# Patient Record
Sex: FEMALE | Race: WHITE | NOT HISPANIC OR LATINO | Employment: FULL TIME | URBAN - METROPOLITAN AREA
[De-identification: names, ages, dates, MRNs, and addresses within clinical notes are randomized per-mention and may not be internally consistent; named-entity substitution may affect disease eponyms.]

---

## 2021-10-27 ENCOUNTER — OFFICE VISIT (OUTPATIENT)
Dept: INTERNAL MEDICINE | Facility: CLINIC | Age: 56
End: 2021-10-27
Payer: COMMERCIAL

## 2021-10-27 VITALS
HEART RATE: 66 BPM | DIASTOLIC BLOOD PRESSURE: 68 MMHG | TEMPERATURE: 99 F | WEIGHT: 130.06 LBS | SYSTOLIC BLOOD PRESSURE: 120 MMHG

## 2021-10-27 DIAGNOSIS — Z91.010 PEANUT ALLERGY: ICD-10-CM

## 2021-10-27 DIAGNOSIS — Z12.31 ENCOUNTER FOR SCREENING MAMMOGRAM FOR HIGH-RISK PATIENT: ICD-10-CM

## 2021-10-27 DIAGNOSIS — Z12.11 COLON CANCER SCREENING: ICD-10-CM

## 2021-10-27 DIAGNOSIS — Z00.00 ROUTINE GENERAL MEDICAL EXAMINATION AT HEALTH CARE FACILITY: Primary | ICD-10-CM

## 2021-10-27 DIAGNOSIS — R13.19 OTHER DYSPHAGIA: ICD-10-CM

## 2021-10-27 PROCEDURE — 99999 PR PBB SHADOW E&M-NEW PATIENT-LVL IV: CPT | Mod: PBBFAC,,, | Performed by: INTERNAL MEDICINE

## 2021-10-27 PROCEDURE — 1159F PR MEDICATION LIST DOCUMENTED IN MEDICAL RECORD: ICD-10-PCS | Mod: CPTII,S$GLB,, | Performed by: INTERNAL MEDICINE

## 2021-10-27 PROCEDURE — 3074F SYST BP LT 130 MM HG: CPT | Mod: CPTII,S$GLB,, | Performed by: INTERNAL MEDICINE

## 2021-10-27 PROCEDURE — 1160F RVW MEDS BY RX/DR IN RCRD: CPT | Mod: CPTII,S$GLB,, | Performed by: INTERNAL MEDICINE

## 2021-10-27 PROCEDURE — 3078F DIAST BP <80 MM HG: CPT | Mod: CPTII,S$GLB,, | Performed by: INTERNAL MEDICINE

## 2021-10-27 PROCEDURE — 90471 FLU VACCINE (QUAD) GREATER THAN OR EQUAL TO 3YO PRESERVATIVE FREE IM: ICD-10-PCS | Mod: S$GLB,,, | Performed by: INTERNAL MEDICINE

## 2021-10-27 PROCEDURE — 3078F PR MOST RECENT DIASTOLIC BLOOD PRESSURE < 80 MM HG: ICD-10-PCS | Mod: CPTII,S$GLB,, | Performed by: INTERNAL MEDICINE

## 2021-10-27 PROCEDURE — 1160F PR REVIEW ALL MEDS BY PRESCRIBER/CLIN PHARMACIST DOCUMENTED: ICD-10-PCS | Mod: CPTII,S$GLB,, | Performed by: INTERNAL MEDICINE

## 2021-10-27 PROCEDURE — 90686 FLU VACCINE (QUAD) GREATER THAN OR EQUAL TO 3YO PRESERVATIVE FREE IM: ICD-10-PCS | Mod: S$GLB,,, | Performed by: INTERNAL MEDICINE

## 2021-10-27 PROCEDURE — 90471 IMMUNIZATION ADMIN: CPT | Mod: S$GLB,,, | Performed by: INTERNAL MEDICINE

## 2021-10-27 PROCEDURE — 3074F PR MOST RECENT SYSTOLIC BLOOD PRESSURE < 130 MM HG: ICD-10-PCS | Mod: CPTII,S$GLB,, | Performed by: INTERNAL MEDICINE

## 2021-10-27 PROCEDURE — 1159F MED LIST DOCD IN RCRD: CPT | Mod: CPTII,S$GLB,, | Performed by: INTERNAL MEDICINE

## 2021-10-27 PROCEDURE — 99999 PR PBB SHADOW E&M-NEW PATIENT-LVL IV: ICD-10-PCS | Mod: PBBFAC,,, | Performed by: INTERNAL MEDICINE

## 2021-10-27 PROCEDURE — 90686 IIV4 VACC NO PRSV 0.5 ML IM: CPT | Mod: S$GLB,,, | Performed by: INTERNAL MEDICINE

## 2021-10-27 PROCEDURE — 99396 PR PREVENTIVE VISIT,EST,40-64: ICD-10-PCS | Mod: 25,S$GLB,, | Performed by: INTERNAL MEDICINE

## 2021-10-27 PROCEDURE — 99396 PREV VISIT EST AGE 40-64: CPT | Mod: 25,S$GLB,, | Performed by: INTERNAL MEDICINE

## 2021-10-27 RX ORDER — EPINEPHRINE 0.3 MG/.3ML
1 INJECTION SUBCUTANEOUS ONCE
Qty: 2 EACH | Refills: 5 | Status: SHIPPED | OUTPATIENT
Start: 2021-10-27 | End: 2023-10-30

## 2021-10-28 ENCOUNTER — LAB VISIT (OUTPATIENT)
Dept: LAB | Facility: HOSPITAL | Age: 56
End: 2021-10-28
Attending: INTERNAL MEDICINE
Payer: COMMERCIAL

## 2021-10-28 DIAGNOSIS — Z00.00 ROUTINE GENERAL MEDICAL EXAMINATION AT HEALTH CARE FACILITY: ICD-10-CM

## 2021-10-28 LAB
ALBUMIN SERPL BCP-MCNC: 3.9 G/DL (ref 3.5–5.2)
ALP SERPL-CCNC: 71 U/L (ref 55–135)
ALT SERPL W/O P-5'-P-CCNC: 24 U/L (ref 10–44)
ANION GAP SERPL CALC-SCNC: 9 MMOL/L (ref 8–16)
AST SERPL-CCNC: 24 U/L (ref 10–40)
BASOPHILS # BLD AUTO: 0.05 K/UL (ref 0–0.2)
BASOPHILS NFR BLD: 1.1 % (ref 0–1.9)
BILIRUB SERPL-MCNC: 0.4 MG/DL (ref 0.1–1)
BUN SERPL-MCNC: 19 MG/DL (ref 6–20)
CALCIUM SERPL-MCNC: 9.3 MG/DL (ref 8.7–10.5)
CHLORIDE SERPL-SCNC: 108 MMOL/L (ref 95–110)
CHOLEST SERPL-MCNC: 160 MG/DL (ref 120–199)
CHOLEST/HDLC SERPL: 2.4 {RATIO} (ref 2–5)
CO2 SERPL-SCNC: 26 MMOL/L (ref 23–29)
CREAT SERPL-MCNC: 0.8 MG/DL (ref 0.5–1.4)
DIFFERENTIAL METHOD: ABNORMAL
EOSINOPHIL # BLD AUTO: 0.2 K/UL (ref 0–0.5)
EOSINOPHIL NFR BLD: 3.8 % (ref 0–8)
ERYTHROCYTE [DISTWIDTH] IN BLOOD BY AUTOMATED COUNT: 14.1 % (ref 11.5–14.5)
EST. GFR  (AFRICAN AMERICAN): >60 ML/MIN/1.73 M^2
EST. GFR  (NON AFRICAN AMERICAN): >60 ML/MIN/1.73 M^2
GLUCOSE SERPL-MCNC: 88 MG/DL (ref 70–110)
HCT VFR BLD AUTO: 40.5 % (ref 37–48.5)
HDLC SERPL-MCNC: 67 MG/DL (ref 40–75)
HDLC SERPL: 41.9 % (ref 20–50)
HGB BLD-MCNC: 12.9 G/DL (ref 12–16)
IMM GRANULOCYTES # BLD AUTO: 0.01 K/UL (ref 0–0.04)
IMM GRANULOCYTES NFR BLD AUTO: 0.2 % (ref 0–0.5)
LDLC SERPL CALC-MCNC: 81.6 MG/DL (ref 63–159)
LYMPHOCYTES # BLD AUTO: 1 K/UL (ref 1–4.8)
LYMPHOCYTES NFR BLD: 21.8 % (ref 18–48)
MCH RBC QN AUTO: 29.3 PG (ref 27–31)
MCHC RBC AUTO-ENTMCNC: 31.9 G/DL (ref 32–36)
MCV RBC AUTO: 92 FL (ref 82–98)
MONOCYTES # BLD AUTO: 0.5 K/UL (ref 0.3–1)
MONOCYTES NFR BLD: 9.5 % (ref 4–15)
NEUTROPHILS # BLD AUTO: 3 K/UL (ref 1.8–7.7)
NEUTROPHILS NFR BLD: 63.6 % (ref 38–73)
NONHDLC SERPL-MCNC: 93 MG/DL
NRBC BLD-RTO: 0 /100 WBC
PLATELET # BLD AUTO: 238 K/UL (ref 150–450)
PMV BLD AUTO: 10.7 FL (ref 9.2–12.9)
POTASSIUM SERPL-SCNC: 4.1 MMOL/L (ref 3.5–5.1)
PROT SERPL-MCNC: 6.8 G/DL (ref 6–8.4)
RBC # BLD AUTO: 4.41 M/UL (ref 4–5.4)
SODIUM SERPL-SCNC: 143 MMOL/L (ref 136–145)
TRIGL SERPL-MCNC: 57 MG/DL (ref 30–150)
TSH SERPL DL<=0.005 MIU/L-ACNC: 1.27 UIU/ML (ref 0.4–4)
WBC # BLD AUTO: 4.76 K/UL (ref 3.9–12.7)

## 2021-10-28 PROCEDURE — 86803 HEPATITIS C AB TEST: CPT | Performed by: INTERNAL MEDICINE

## 2021-10-28 PROCEDURE — 84443 ASSAY THYROID STIM HORMONE: CPT | Performed by: INTERNAL MEDICINE

## 2021-10-28 PROCEDURE — 80061 LIPID PANEL: CPT | Performed by: INTERNAL MEDICINE

## 2021-10-28 PROCEDURE — 87389 HIV-1 AG W/HIV-1&-2 AB AG IA: CPT | Performed by: INTERNAL MEDICINE

## 2021-10-28 PROCEDURE — 36415 COLL VENOUS BLD VENIPUNCTURE: CPT | Mod: PO | Performed by: INTERNAL MEDICINE

## 2021-10-28 PROCEDURE — 80053 COMPREHEN METABOLIC PANEL: CPT | Performed by: INTERNAL MEDICINE

## 2021-10-28 PROCEDURE — 85025 COMPLETE CBC W/AUTO DIFF WBC: CPT | Performed by: INTERNAL MEDICINE

## 2021-10-29 LAB
HCV AB SERPL QL IA: NEGATIVE
HIV 1+2 AB+HIV1 P24 AG SERPL QL IA: NEGATIVE

## 2021-11-01 ENCOUNTER — LAB VISIT (OUTPATIENT)
Dept: LAB | Facility: HOSPITAL | Age: 56
End: 2021-11-01
Attending: INTERNAL MEDICINE
Payer: COMMERCIAL

## 2021-11-01 DIAGNOSIS — Z12.11 COLON CANCER SCREENING: ICD-10-CM

## 2021-11-01 PROCEDURE — 82274 ASSAY TEST FOR BLOOD FECAL: CPT | Performed by: INTERNAL MEDICINE

## 2021-11-05 LAB — HEMOCCULT STL QL IA: NEGATIVE

## 2021-11-15 ENCOUNTER — HOSPITAL ENCOUNTER (OUTPATIENT)
Dept: RADIOLOGY | Facility: HOSPITAL | Age: 56
Discharge: HOME | End: 2021-11-15
Attending: INTERNAL MEDICINE
Payer: COMMERCIAL

## 2021-11-15 DIAGNOSIS — R13.19 OTHER DYSPHAGIA: ICD-10-CM

## 2021-11-15 PROCEDURE — A9698 NON-RAD CONTRAST MATERIALNOC: HCPCS | Performed by: INTERNAL MEDICINE

## 2021-11-15 PROCEDURE — 92611 MOTION FLUOROSCOPY/SWALLOW: CPT | Performed by: SPEECH-LANGUAGE PATHOLOGIST

## 2021-11-15 PROCEDURE — 74230 FL MODIFIED BARIUM SWALLOW SPEECH STUDY: ICD-10-PCS | Mod: 26,,, | Performed by: RADIOLOGY

## 2021-11-15 PROCEDURE — 74230 X-RAY XM SWLNG FUNCJ C+: CPT | Mod: 26,,, | Performed by: RADIOLOGY

## 2021-11-15 PROCEDURE — 74230 X-RAY XM SWLNG FUNCJ C+: CPT | Mod: TC

## 2021-11-15 PROCEDURE — 25500020 PHARM REV CODE 255: Performed by: INTERNAL MEDICINE

## 2021-11-15 RX ADMIN — BARIUM SULFATE 68 ML: 0.81 POWDER, FOR SUSPENSION ORAL at 11:11

## 2021-11-17 ENCOUNTER — OFFICE VISIT (OUTPATIENT)
Dept: OBSTETRICS AND GYNECOLOGY | Facility: CLINIC | Age: 56
End: 2021-11-17
Payer: COMMERCIAL

## 2021-11-17 VITALS
WEIGHT: 129.19 LBS | BODY MASS INDEX: 22.89 KG/M2 | HEIGHT: 63 IN | DIASTOLIC BLOOD PRESSURE: 62 MMHG | SYSTOLIC BLOOD PRESSURE: 112 MMHG

## 2021-11-17 DIAGNOSIS — Z01.419 ENCOUNTER FOR GYNECOLOGICAL EXAMINATION WITHOUT ABNORMAL FINDING: Primary | ICD-10-CM

## 2021-11-17 DIAGNOSIS — Z00.00 ROUTINE GENERAL MEDICAL EXAMINATION AT HEALTH CARE FACILITY: ICD-10-CM

## 2021-11-17 DIAGNOSIS — Z12.31 ENCOUNTER FOR SCREENING MAMMOGRAM FOR MALIGNANT NEOPLASM OF BREAST: ICD-10-CM

## 2021-11-17 PROBLEM — D68.03: Status: ACTIVE | Noted: 2021-11-17

## 2021-11-17 PROCEDURE — 99999 PR PBB SHADOW E&M-EST. PATIENT-LVL III: ICD-10-PCS | Mod: PBBFAC,,, | Performed by: NURSE PRACTITIONER

## 2021-11-17 PROCEDURE — 1159F MED LIST DOCD IN RCRD: CPT | Mod: CPTII,S$GLB,, | Performed by: NURSE PRACTITIONER

## 2021-11-17 PROCEDURE — 3078F PR MOST RECENT DIASTOLIC BLOOD PRESSURE < 80 MM HG: ICD-10-PCS | Mod: CPTII,S$GLB,, | Performed by: NURSE PRACTITIONER

## 2021-11-17 PROCEDURE — 3074F PR MOST RECENT SYSTOLIC BLOOD PRESSURE < 130 MM HG: ICD-10-PCS | Mod: CPTII,S$GLB,, | Performed by: NURSE PRACTITIONER

## 2021-11-17 PROCEDURE — 3008F BODY MASS INDEX DOCD: CPT | Mod: CPTII,S$GLB,, | Performed by: NURSE PRACTITIONER

## 2021-11-17 PROCEDURE — 99999 PR PBB SHADOW E&M-EST. PATIENT-LVL III: CPT | Mod: PBBFAC,,, | Performed by: NURSE PRACTITIONER

## 2021-11-17 PROCEDURE — 1160F RVW MEDS BY RX/DR IN RCRD: CPT | Mod: CPTII,S$GLB,, | Performed by: NURSE PRACTITIONER

## 2021-11-17 PROCEDURE — 99386 PR PREVENTIVE VISIT,NEW,40-64: ICD-10-PCS | Mod: S$GLB,,, | Performed by: NURSE PRACTITIONER

## 2021-11-17 PROCEDURE — 1160F PR REVIEW ALL MEDS BY PRESCRIBER/CLIN PHARMACIST DOCUMENTED: ICD-10-PCS | Mod: CPTII,S$GLB,, | Performed by: NURSE PRACTITIONER

## 2021-11-17 PROCEDURE — 1159F PR MEDICATION LIST DOCUMENTED IN MEDICAL RECORD: ICD-10-PCS | Mod: CPTII,S$GLB,, | Performed by: NURSE PRACTITIONER

## 2021-11-17 PROCEDURE — 3008F PR BODY MASS INDEX (BMI) DOCUMENTED: ICD-10-PCS | Mod: CPTII,S$GLB,, | Performed by: NURSE PRACTITIONER

## 2021-11-17 PROCEDURE — 99386 PREV VISIT NEW AGE 40-64: CPT | Mod: S$GLB,,, | Performed by: NURSE PRACTITIONER

## 2021-11-17 PROCEDURE — 3074F SYST BP LT 130 MM HG: CPT | Mod: CPTII,S$GLB,, | Performed by: NURSE PRACTITIONER

## 2021-11-17 PROCEDURE — 3078F DIAST BP <80 MM HG: CPT | Mod: CPTII,S$GLB,, | Performed by: NURSE PRACTITIONER

## 2021-11-18 ENCOUNTER — PATIENT MESSAGE (OUTPATIENT)
Dept: INTERNAL MEDICINE | Facility: CLINIC | Age: 56
End: 2021-11-18
Payer: COMMERCIAL

## 2021-11-29 ENCOUNTER — TELEPHONE (OUTPATIENT)
Dept: INTERNAL MEDICINE | Facility: CLINIC | Age: 56
End: 2021-11-29
Payer: COMMERCIAL

## 2021-12-02 ENCOUNTER — HOSPITAL ENCOUNTER (OUTPATIENT)
Dept: RADIOLOGY | Facility: HOSPITAL | Age: 56
Discharge: HOME | End: 2021-12-02
Attending: NURSE PRACTITIONER
Payer: COMMERCIAL

## 2021-12-02 DIAGNOSIS — Z12.31 ENCOUNTER FOR SCREENING MAMMOGRAM FOR MALIGNANT NEOPLASM OF BREAST: ICD-10-CM

## 2021-12-02 DIAGNOSIS — Z01.419 ENCOUNTER FOR GYNECOLOGICAL EXAMINATION WITHOUT ABNORMAL FINDING: ICD-10-CM

## 2021-12-02 PROCEDURE — 77067 SCR MAMMO BI INCL CAD: CPT | Mod: TC

## 2021-12-02 PROCEDURE — 77067 MAMMO DIGITAL SCREENING BILAT WITH TOMO: ICD-10-PCS | Mod: 26,,, | Performed by: RADIOLOGY

## 2021-12-02 PROCEDURE — 77067 SCR MAMMO BI INCL CAD: CPT | Mod: 26,,, | Performed by: RADIOLOGY

## 2021-12-02 PROCEDURE — 77063 MAMMO DIGITAL SCREENING BILAT WITH TOMO: ICD-10-PCS | Mod: 26,,, | Performed by: RADIOLOGY

## 2021-12-02 PROCEDURE — 77063 BREAST TOMOSYNTHESIS BI: CPT | Mod: 26,,, | Performed by: RADIOLOGY

## 2022-05-12 ENCOUNTER — PATIENT MESSAGE (OUTPATIENT)
Dept: INTERNAL MEDICINE | Facility: CLINIC | Age: 57
End: 2022-05-12
Payer: COMMERCIAL

## 2022-05-12 RX ORDER — VALACYCLOVIR HYDROCHLORIDE 500 MG/1
1500 TABLET, FILM COATED ORAL ONCE
Qty: 30 TABLET | Refills: 0 | Status: SHIPPED | OUTPATIENT
Start: 2022-05-12 | End: 2022-05-12

## 2022-06-03 DIAGNOSIS — M25.551 RIGHT HIP PAIN: Primary | ICD-10-CM

## 2022-06-07 ENCOUNTER — HOSPITAL ENCOUNTER (OUTPATIENT)
Dept: RADIOLOGY | Facility: HOSPITAL | Age: 57
Discharge: HOME | End: 2022-06-07
Attending: STUDENT IN AN ORGANIZED HEALTH CARE EDUCATION/TRAINING PROGRAM
Payer: COMMERCIAL

## 2022-06-07 ENCOUNTER — OFFICE VISIT (OUTPATIENT)
Dept: ORTHOPEDICS | Facility: CLINIC | Age: 57
End: 2022-06-07
Payer: COMMERCIAL

## 2022-06-07 VITALS — BODY MASS INDEX: 22.86 KG/M2 | WEIGHT: 129 LBS | HEIGHT: 63 IN

## 2022-06-07 DIAGNOSIS — M25.551 RIGHT HIP PAIN: ICD-10-CM

## 2022-06-07 DIAGNOSIS — M70.61 GREATER TROCHANTERIC BURSITIS OF RIGHT HIP: Primary | ICD-10-CM

## 2022-06-07 PROCEDURE — 73502 X-RAY EXAM HIP UNI 2-3 VIEWS: CPT | Mod: 26,RT,, | Performed by: RADIOLOGY

## 2022-06-07 PROCEDURE — 73502 XR HIP WITH PELVIS WHEN PERFORMED, 2 OR 3  VIEWS RIGHT: ICD-10-PCS | Mod: 26,RT,, | Performed by: RADIOLOGY

## 2022-06-07 PROCEDURE — 99203 OFFICE O/P NEW LOW 30 MIN: CPT | Mod: 25,S$GLB,, | Performed by: STUDENT IN AN ORGANIZED HEALTH CARE EDUCATION/TRAINING PROGRAM

## 2022-06-07 PROCEDURE — 73502 X-RAY EXAM HIP UNI 2-3 VIEWS: CPT | Mod: TC,RT

## 2022-06-07 PROCEDURE — 99999 PR PBB SHADOW E&M-EST. PATIENT-LVL III: CPT | Mod: PBBFAC,,, | Performed by: STUDENT IN AN ORGANIZED HEALTH CARE EDUCATION/TRAINING PROGRAM

## 2022-06-07 PROCEDURE — 20610 LARGE JOINT ASPIRATION/INJECTION: R GREATER TROCHANTERIC BURSA: ICD-10-PCS | Mod: RT,S$GLB,, | Performed by: STUDENT IN AN ORGANIZED HEALTH CARE EDUCATION/TRAINING PROGRAM

## 2022-06-07 PROCEDURE — 99999 PR PBB SHADOW E&M-EST. PATIENT-LVL III: ICD-10-PCS | Mod: PBBFAC,,, | Performed by: STUDENT IN AN ORGANIZED HEALTH CARE EDUCATION/TRAINING PROGRAM

## 2022-06-07 PROCEDURE — 20610 DRAIN/INJ JOINT/BURSA W/O US: CPT | Mod: RT,S$GLB,, | Performed by: STUDENT IN AN ORGANIZED HEALTH CARE EDUCATION/TRAINING PROGRAM

## 2022-06-07 PROCEDURE — 99203 PR OFFICE/OUTPT VISIT, NEW, LEVL III, 30-44 MIN: ICD-10-PCS | Mod: 25,S$GLB,, | Performed by: STUDENT IN AN ORGANIZED HEALTH CARE EDUCATION/TRAINING PROGRAM

## 2022-06-07 RX ADMIN — TRIAMCINOLONE ACETONIDE 40 MG: 40 INJECTION, SUSPENSION INTRA-ARTICULAR; INTRAMUSCULAR at 10:06

## 2022-06-07 NOTE — PROGRESS NOTES
Orthopaedics Sports Medicine     Right Hip Initial Visit         2022    Referring MD: Self, Aaareferral    Chief Complaint   Patient presents with    Right Hip - Pain         History of Present Illness:   Jemima Aguillon is a 56 y.o. female who presents with right hip pain and dysfunction.    Onset of the symptoms was      Inciting event: Patient was hiking and slippedd on a rock. She has had issues with tightness and discomfort since.     Current symptoms include pain in the right hip and buttock, pain quality is intermittent aching and throbbing pain along with sharp, shooting pain that radiates down her right leg.     Pain is aggravated by sitting too long.      Evaluation to date: x-rays     Treatment to date: activity modification, NSAIDs, stretching     Past Medical History:   Past Medical History:   Diagnosis Date    Allergy        Past Surgical History:   Past Surgical History:   Procedure Laterality Date     SECTION      x3    HYSTERECTOMY  2010    single oophorectomy; endometriosis    OOPHORECTOMY      1 ovary removed    URETEROSCOPY         Medications:  Patient's Medications   New Prescriptions    No medications on file   Previous Medications    EPINEPHRINE (EPIPEN) 0.3 MG/0.3 ML ATIN    Inject 0.3 mLs (0.3 mg total) into the muscle once. for 1 dose    VALACYCLOVIR (VALTREX) 500 MG TABLET    Take 3 tablets (1,500 mg total) by mouth once. Take at earliest onset of fever blister for 1 dose   Modified Medications    No medications on file   Discontinued Medications    No medications on file       Allergies:   Review of patient's allergies indicates:   Allergen Reactions    Peanut Anaphylaxis, Anxiety, Diarrhea, Hives, Itching, Nausea And Vomiting, Palpitations, Shortness Of Breath and Swelling    Soy Anaphylaxis, Anxiety, Diarrhea, Hives, Itching, Nausea And Vomiting, Other (See Comments), Palpitations, Shortness Of Breath and Swelling    Corn containing products Other (See  "Comments)    Wheat containing prod        Social History:   Home town: [place], [place]  Occupation:  at [employer]  Alcohol use: She reports previous alcohol use of about 2.0 standard drinks of alcohol per week.  Tobacco use: She reports that she has never smoked. She has never used smokeless tobacco.    Review of systems:  History of recent illness, fevers, shakes, or chills: no  History of cardiac problems or chest pain: no  History of pulmonary problems or asthma: no  History of diabetes: no  History of prior dvt or clotting problems: no  History of sleep apnea: no      Physical Examination:  Estimated body mass index is 23.22 kg/m² as calculated from the following:    Height as of this encounter: 5' 2.5" (1.588 m).    Weight as of this encounter: 58.5 kg (129 lb).    General  Healthy appearing female in no acute distress  Alert and oriented, normal mood, appropriate affect    Right Hip Exam     Tenderness   The patient is experiencing tenderness in the greater trochanter and ischial tuberosity.    Range of Motion   Flexion: 110   External rotation: 40   Internal rotation: 30     Tests   TORSTEN: positive  Crow: positive    Other   Erythema: absent  Sensation: normal            Imaging:  X-Ray Hip 2 or 3 views Right (with Pelvis when performed)  Narrative: EXAMINATION:  XR HIP WITH PELVIS WHEN PERFORMED, 2 OR 3  VIEWS RIGHT    CLINICAL HISTORY:  Pain in right hip    TECHNIQUE:  AP view of the pelvis and frog leg lateral view of the right hip were performed.    COMPARISON:  None    FINDINGS:  Hip joints are symmetric in appearance.  No evidence of AVN.  No fracture or dislocation.  No significant joint space narrowing.  No soft tissue abnormality.  Impression: See above    Electronically signed by: Anjel Ochoa MD  Date:    06/07/2022  Time:    10:18       Physician Read: I agree with the above impression.      Impression:  56 y.o. female with right hip and thigh pain, gluteus medius tendinopathy with " greater trochanteric bursitis, ITB syndrome      Plan:  1. Discussed diagnosis and treatment options with the patient today.  Her history, physical exam, and imaging findings are most consistent with gluteus medius tendinopathy and greater trochanteric bursitis as well as ITB band syndrome.  2. She has tried multiple nonoperative treatments including rest, activity modification, and oral anti-inflammatories.  She has also done some self-directed therapy.  3. I recommend corticosteroid injection into the trochanteric bursa today.  She has an upcoming 50 mi hike next week and I think this will give her good symptomatic relief for this.  4. For more long-term treatment, recommend dedicated physical therapy.  Referral was placed today  5. Follow up with me in 2 months as needed           Max Cedillo MD

## 2022-06-08 RX ORDER — TRIAMCINOLONE ACETONIDE 40 MG/ML
40 INJECTION, SUSPENSION INTRA-ARTICULAR; INTRAMUSCULAR
Status: DISCONTINUED | OUTPATIENT
Start: 2022-06-07 | End: 2022-06-08 | Stop reason: HOSPADM

## 2022-06-08 NOTE — PROCEDURES
Large Joint Aspiration/Injection: R greater trochanteric bursa    Date/Time: 6/7/2022 10:20 AM  Performed by: Max Cedillo MD  Authorized by: Max Cedillo MD     Consent Done?:  Yes (Verbal)  Indications:  Pain  Site marked: the procedure site was marked    Timeout: prior to procedure the correct patient, procedure, and site was verified    Prep: patient was prepped and draped in usual sterile fashion      Local anesthesia used?: Yes    Anesthesia:  Local infiltration  Local anesthetic:  Lidocaine 1% without epinephrine    Details:  Needle Size:  22 G  Ultrasonic Guidance for needle placement?: No    Approach:  Lateral  Location:  Hip  Site:  R greater trochanteric bursa  Medications:  40 mg triamcinolone acetonide 40 mg/mL  Patient tolerance:  Patient tolerated the procedure well with no immediate complications

## 2022-06-16 ENCOUNTER — CLINICAL SUPPORT (OUTPATIENT)
Dept: REHABILITATION | Facility: HOSPITAL | Age: 57
End: 2022-06-16
Attending: STUDENT IN AN ORGANIZED HEALTH CARE EDUCATION/TRAINING PROGRAM
Payer: COMMERCIAL

## 2022-06-16 DIAGNOSIS — R29.898 IMPAIRED FLEXIBILITY OF LOWER EXTREMITY: ICD-10-CM

## 2022-06-16 DIAGNOSIS — M70.61 GREATER TROCHANTERIC BURSITIS OF RIGHT HIP: ICD-10-CM

## 2022-06-16 DIAGNOSIS — R29.898 DECREASED STRENGTH OF LOWER EXTREMITY: ICD-10-CM

## 2022-06-16 PROCEDURE — 97110 THERAPEUTIC EXERCISES: CPT

## 2022-06-16 PROCEDURE — 97161 PT EVAL LOW COMPLEX 20 MIN: CPT

## 2022-06-16 NOTE — PLAN OF CARE
[facility]   Physical Therapy Initial Evaluation     Name: Jemima Aguillon  Clinic Number: [id number]    Therapy Diagnosis:   Encounter Diagnoses   Name Primary?    Greater trochanteric bursitis of right hip     Decreased strength of lower extremity     Impaired flexibility of lower extremity      Physician: Max Cedillo*     Physician Orders: PT Eval and Treat  Medical Diagnosis from Referral: Greater trochanteric bursitis of right hip [M70.61]  Evaluation Date: 6/16/2022  Authorization Period Expiration: 6/7/23  Plan of Care Expiration: 8/17/2022    Progress Update: 7/17/2022    Visit # / Visits authorized: 1 / 1     FOTO: Visit #1 - Scored : 1 / 3     PRECAUTIONS: Standard Precautions     MD Visit: no follow up    Time In: 1443  Time Out: 1530  Total Appointment Time (timed & untimed codes): 40 minutes    SUBJECTIVE     Date of onset: 4/17/22    History of current condition - Jemima is a 56 y.o. female whom reports increased pain and discomfort through right hip and has been bothering her for a few months now.  She is an avid cycle and backpacker and is planning on hiking 50 miles [place] next week.  She has a max of 18 miles per day, but is worried that her hip will cause her problems during her trek.  She does use trekking poles while she hikes and her backpack is a max weight of 23.lbs. She cannot take antiinflammatories, but is planning on taking tylenol to help with pain and discomfort     Injection given on 6/7 by Dr. 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   Prior Level of Function: Independent with all ADLs  Current Level of Function: 30% of PLOF  - cycling  - backpacker.     Pain:  Current 2 /10, worst 7 /10, best 1 /10   Location: Right Hip   Description: Aching, Dull and Shooting  Aggravating Factors: walking (stiff), the day after leg day. Split squats- all feels like strength. Single leg standing on right first thing in the morning, sitting prolonged.  Easing Factors: stretching, tylenol    Pts goals: Pt reported goals are to get back to cycling and backpacking as well as all other recreational activities without pain or discomfort.     _______________________________________________________    Medical History:   Past Medical History:   Diagnosis Date    Allergy        Surgical History:   Jemima Aguillon  has a past surgical history that includes Ureteroscopy;  section; Oophorectomy (); and Hysterectomy ().    Medications:   Jemima has a current medication list which includes the following prescription(s): epinephrine and valacyclovir.    Allergies:   Review of patient's allergies indicates:   Allergen Reactions    Peanut Anaphylaxis, Anxiety, Diarrhea, Hives, Itching, Nausea And Vomiting, Palpitations, Shortness Of Breath and Swelling    Soy Anaphylaxis, Anxiety, Diarrhea, Hives, Itching, Nausea And Vomiting, Other (See Comments), Palpitations, Shortness Of Breath and Swelling    Corn containing products Other (See Comments)    Wheat containing prod         OBJECTIVE   (x = not tested due to pain and/or inability to obtain test position)    RANGE OF MOTION:    Lumbar AROM/PROM Right  (spine) Left   Goal   Lumbar Flexion (60) Full  55   Lumbar Extension (30) Full  30   Lumbar Side Bending (25) Full Full 20   Lumbar Rotation Full Full 45     Hip AROM/PROM Right   Left   Goal   Hip Flexion (120) 130 150 115   Hip IR (45) 35 45 40   Hip ER (45) 45 35 40       STRENGTH:    L/E MMT Right    Goal   Hip Flexion  4-/5 5/5 B    Hip Extension  4/5 5/5 B   Hip Abduction  4/5 5/5 B   Hip IR 4/5 5/5 B   Hip ER 4/5 5/5 B   Knee Flexion 5/5 5/5 B   Knee Extension 5/5 5/5 B   Ankle DF 5/5 5/5 B   Ankle PF 5/5 5/5 B     L/E MMT Left   Goal   Hip Flexion  4+/5 5/5 B    Hip Extension  4+/5 5/5 B   Hip Abduction  4+/5 5/5 B   Hip IR 4+/5 5/5 B   Hip ER 4+/5 5/5 B   Knee Flexion 5/5 5/5 B   Knee Extension 5/5 5/5 B   Ankle DF 5/5 5/5 B   Ankle PF 5/5 5/5 B       MUSCLE LENGTH:     Muscle Tested  Right   Left    Goal   Hip Flexors  decreased decreased Normal B   Quadriceps decreased decreased Normal B   Hamstrings  normal decreased Normal B   Piriformis  decreased   10 inches  decreased   6 inches limited Normal B   Gastrocnemius  decreased decreased Normal B   Soleus  decreased decreased Normal B       Sensation:  Sensation is intact to light touch    Palpation: Increased tone and tenderness noted with palpation to: increased through piriformis and glutes    Posture:  Pt presents with postural abnormalities which include: forward head and rounded shoulders    FUNCTION:     CMS Impairment/Limitation/Restriction for FOTO Hip Survey    Therapist reviewed FOTO scores for Jemima Aguillon on 6/16/2022.   FOTO documents entered into [employer] - see Media section.    Limitation Score: 45%         TREATMENT   Total Treatment time separate from Evaluation: (10) minutes     Jemima received the treatments listed below:      THERAPEUTIC EXERCISES: to develop strength, endurance, ROM, flexibility, posture and core stabilization for (10)  minutes including: x = performed today    TherEx 6/16/2022    ROM/Chondral: Upright Bike next    Seated Piriformis Stretch x Home exercise program review               BOLD = new this visit  Plan for Next Visit: needling into piriformis with        PATIENT EDUCATION AND HOME EXERCISES     Education/Self-Care provided:  (included in treatment) minutes    Patient educated on the impairments noted above  and the effects of physical therapy intervention to improve overall condition and QOL.    Patient was educated on all the above exercise prior/during/after for proper posture, positioning, and execution for safe performance with home exercise program.     Written Home Exercises Provided: yes. Prefers: Printed Copies  Exercises were reviewed and Jemima was able to demonstrate them prior to the end of the session.  Jemima demonstrated good understanding of the education provided. See EMR under Patient Instructions for exercises provided during therapy sessions.    ASSESSMENT     Jemima is a 56 y.o. female referred to outpatient Physical Therapy with a medical diagnosis of Greater trochanteric bursitis of right hip [M70.61]. Jemima presents with clinical signs and symptoms that support this diagnosis with decreased lower extremity, increased muscular tension with decreased flexiblity,  and impaired functional mobility. The above impairments will be addressed through manual therapy techniques, therapeutic exercises, functional training, and modalities as necessary. Patient was treated and educated on exercises for home program, progression of therapy, and benefits of therapy to achieve full functional mobility. Patient will benefit from skilled physical therapy to meet short and long term goals and return to prior level of function.    Pt prognosis is Excellent.   Pt will benefit from skilled outpatient Physical Therapy to address the deficits stated above and in the chart below, provide pt/family education, and to maximize pt's level of independence.     Plan of care discussed with patient: Yes  Pt's spiritual, cultural and educational needs considered and patient is agreeable to the plan of care and goals as stated below:     Anticipated Barriers for therapy: none    Medical Necessity is demonstrated by the following:   History  Co-morbidities and personal factors that may impact the plan of care Co-morbidities:  "  Past Medical History:   Diagnosis Date    Allergy        Personal Factors:   social background  lifestyle     low   Examination  Body Structures and Functions, activity limitations and participation restrictions that may impact the plan of care Body Regions:   lower extremities    Body Systems:    gross symmetry  ROM  strength  gross coordinated movement  balance  gait    Participation Restrictions:   See above in "Current Level of Function"     Activity limitations:   Learning and applying knowledge  no deficits    General Tasks and Commands  no deficits    Communication  no deficits    Mobility  lifting and carrying objects  walking    Self care  dressing    Domestic Life  doing house work (cleaning house, washing dishes, laundry)  assisting others    Interactions/Relationships  no deficits    Life Areas  no deficits    Community and Social Life  community life  recreation and leisure         high   Clinical Presentation stable and uncomplicated low   Decision Making/ Complexity Score: low       GOALS:  SHORT TERM GOALS: 4 weeks, (7/16/22) Progress   1. Recent signs and systems trend is improving in order to progress towards LTG's.    2. Patient will be independent with HEP in order to further progress and return to maximal function.    3. Pain rating at Worst: 5/10 in order to progress towards increased independence with activity.    4. Patient will be able to correct postural deviations in sitting and standing, to decrease pain and promote postural awareness for injury prevention.       LONG TERM GOALS: 8 weeks, (8/16/22) Progress   1. Patient will return to normal ADL, recreational, and work related activities with less pain and limitation.     2. Patient will improve AROM to stated goals in order to return to maximal functional potential.     3. Patient will improve Strength to stated goals of appropriate musculature in order to improve functional independence.     4. Pain Rating at Best: 1/10 to improve " Quality of Life.     5. Patient will meet predicted functional outcome (FOTO) score: 40% to increase self-worth & perceived functional ability.    6. Patient will have met/partially met personal goal of: getting back to all recreational activities without discomfort.           PLAN   Plan of care Certification: 6/16/2022 to 8/16/2022    Outpatient Physical Therapy 1-2 times weekly for 8 weeks to include any combination of the following interventions: virtual visits, dry needling, modalities, electrical stimulation (IFC, Pre-Mod, Attended with Functional Dry Needling), Gait Training, Manual Therapy, Moist Heat/ Ice, Neuromuscular Re-ed, Patient Education, Self Care, Therapeutic Exercise, Functional Training and Therapeutic Activites     Thank you for this referral.    Leidy Negron, PT      I CERTIFY THE NEED FOR THESE SERVICES FURNISHED UNDER THIS PLAN OF TREATMENT AND WHILE UNDER MY CARE   Physician's comments:     Physician's Signature: ___________________________________________________

## 2022-06-17 ENCOUNTER — CLINICAL SUPPORT (OUTPATIENT)
Dept: REHABILITATION | Facility: HOSPITAL | Age: 57
End: 2022-06-17
Attending: STUDENT IN AN ORGANIZED HEALTH CARE EDUCATION/TRAINING PROGRAM
Payer: COMMERCIAL

## 2022-06-17 DIAGNOSIS — R29.898 DECREASED STRENGTH OF LOWER EXTREMITY: Primary | ICD-10-CM

## 2022-06-17 DIAGNOSIS — R29.898 IMPAIRED FLEXIBILITY OF LOWER EXTREMITY: ICD-10-CM

## 2022-06-17 PROCEDURE — 97110 THERAPEUTIC EXERCISES: CPT

## 2022-06-17 PROCEDURE — 97140 MANUAL THERAPY 1/> REGIONS: CPT

## 2022-06-17 NOTE — PROGRESS NOTES
[facility]   Physical Therapy Treatment Note     Name: Jemima Aguillon  Clinic Number: [id number]    Therapy Diagnosis:   Encounter Diagnoses   Name Primary?    Decreased strength of lower extremity Yes    Impaired flexibility of lower extremity      Physician: Max Cedillo    Visit Date: 6/17/2022    Physician Orders: PT Eval and Treat  Medical Diagnosis from Referral: Greater trochanteric bursitis of right hip [M70.61]  Evaluation Date: 6/16/2022  Authorization Period Expiration: 6/7/23  Plan of Care Expiration: 8/17/2022                Progress Update: 7/17/2022               Visit # / Visits authorized: 1 / 20                       FOTO: Visit #1 - Scored : 1 / 3      PRECAUTIONS: Standard Precautions      MD Visit: no follow up    PTA Visit #: 0/5     Time In: 1430  Time Out: 1515  Total Billable Time: 45 minutes    SUBJECTIVE     Pt reports:  She is feeling better with the focused stretching, but really wants to try something to help with the tightness before she hikes the [place].  Compliance with Hep: Daily  Response to previous treatment: no adverse reactions to treatment/updated HEP  Functional change: Better    Pain: 3/10   Worst: 4/10  Location: Right HIp      OBJECTIVE     Objective Measures updated at progress report unless specified otherwise.    Piriformis Flexibility:   - Right: 31.5 cm (pre-needling) 25 cm (after needling)    Treatment     Gym/Equipment Access: Full gym access  Time to Complete Exercises: as expected.    Jemima received the treatments listed below:    MANUAL THERAPY TECHNIQUES: Joint mobilizations, Soft tissue Mobilization, and mobilization with movement were applied to the area listed below for (25) minutes, including: x = exercises performed     Manual Intervention 6/17/2022    Soft Tissue Mobilization x Piriformis release, right hip   Joint Mobilizations     Mobilization with movement     Functional Dry Needling      BOLD = new this  visit  Plan for Next Visit:        See EMR under MEDIA for written consent provided 6/17/22   Palpation Assessment to determine the necessity for Functional Dry Needling  .     Functional Dry Needling: x Location: Right Hipe  Needle Type: Romanian Chi (med length)  Technique: Manual and Electrical  Response: no adverse reactions     BOLD = new this visit     ESTIM :: muscle groups 6/17/2022 TIME Intensity (mA)  Details   Piriformis x 20 min 8 ma bracketed   STEM SETTINGS: 2-4 Hz, 150-250 ms, Constant/continuous bi-phasic waveform    Plan for Next Visit: repeat as appropriate            THERAPEUTIC EXERCISES: to develop strength, endurance, ROM, flexibility, posture and core stabilization for (15)  minutes including: x = performed today     TherEx 6/17/2022     ROM/Chondral: Upright Bike next     Seated Piriformis Stretch x 30s x5 each side   Self Soft Tissue release- piriformis x Lacrosse Ball, 5 minutes    Statis stretching rougine x Home exercise program review    Dynamic Stretching routine x Home exercise program review                BOLD = new this visit  Plan for Next Visit:          PATIENT EDUCATION AND HOME EXERCISES      Education/Self-Care provided:  (included in treatment) minutes   · Patient educated on the impairments noted above and the effects of physical therapy intervention to improve overall condition and QOL.   · Patient was educated on all the above exercise prior/during/after for proper posture, positioning, and execution for safe performance with home exercise program.      Written Home Exercises Provided: yes. Prefers: Printed Copies  Exercises were reviewed and Jemima was able to demonstrate them prior to the end of the session.  Jemima demonstrated good understanding of the education provided. See EMR under Patient Instructions for exercises provided during therapy sessions.        ASSESSMENT     Jemima Aguillon received a semi-standardized functional dry needling approach that was performed for  hip pain with right hip insertions targeting trigger points in the following structures: piriformis and glutes.  Needles were left in for 20 minutes with manual and electric stimulation.  Following treatment, needles were removed, no adverse events were noted after treatment and patient appeared to benefit from treatment following intervention.  Jemima demonstrated good understanding of functional dry needling interventions and expectations and outcomes.       Jemima Is progressing well towards her goals.   Pt prognosis is Good.     Pt will continue to benefit from skilled outpatient physical therapy to address the deficits listed in the problem list box on initial evaluation, provide pt/family education and to maximize pt's level of independence in the home and community environment.     Pt's spiritual, cultural and educational needs considered and pt agreeable to plan of care and goals.      Anticipated Barriers for therapy: none        GOALS:  SHORT TERM GOALS: 4 weeks, (7/16/22) Progress   1. Recent signs and systems trend is improving in order to progress towards LTG's.     2. Patient will be independent with HEP in order to further progress and return to maximal function.     3. Pain rating at Worst: 5/10 in order to progress towards increased independence with activity.     4. Patient will be able to correct postural deviations in sitting and standing, to decrease pain and promote postural awareness for injury prevention.         LONG TERM GOALS: 8 weeks, (8/16/22) Progress   1. Patient will return to normal ADL, recreational, and work related activities with less pain and limitation.      2. Patient will improve AROM to stated goals in order to return to maximal functional potential.      3. Patient will improve Strength to stated goals of appropriate musculature in order to improve functional independence.      4. Pain Rating at Best: 1/10 to improve Quality of Life.      5. Patient will meet predicted  functional outcome (FOTO) score: 40% to increase self-worth & perceived functional ability.     6. Patient will have met/partially met personal goal of: getting back to all recreational activities without discomfort.         PC = progressing/continue  PM= partially met  DC= discontinue    PLAN     Continue Plan of Care (POC) and progress per patient tolerance. See Treatment section for exercise progression.    Leidy Negron, PT   COUGH x 2 weeks

## 2022-06-17 NOTE — PATIENT INSTRUCTIONS
Dynamic Warm Up- Athletics [[id number]]    Dynamic Warm Up- Quad Pull -  Repeat 2 Times, Complete 2 Sets, Times a Day    Dynamic Hamstring and Calf stretch -  Repeat 2 Times, Complete 2 Sets, Times a Day    DYNAMIC WARM UP-HAMSTRING KICK OUT -  Repeat 2 Times, Complete 2 Sets, Times a Day    Dynamic Warm Up- Knee Huggers -  Repeat 2 Times, Complete 2 Sets, Times a Day

## 2022-06-17 NOTE — PATIENT INSTRUCTIONS
Sent vis SMS to patients phone on 6/17 at 1148 AM    LE Athlete Stretching- Static & Active [[id number]]    HAMSTRING STRETCH - SUPINE -  Repeat 3 Times, Hold 30 Seconds, Perform 2 Times a Day    PIRIFORMIS STRETCH -  Repeat 3 Times, Hold 30 Seconds, Perform 2 Times a Day    PRONE QUAD STRETCH WITH BELT OR STRAP -  Repeat 3 Times, Hold 30 Seconds, Perform 2 Times a Day    PIRIFORMIS AND HIP STRETCH - SEATED -  Repeat 3 Times, Hold 30 Seconds, Perform 2 Times a Day    STANDING CALF STRETCH  - GASTROCNEMIUS -  Repeat 3 Times, Hold 30 Seconds, Perform 2 Times a Day    STANDING CALF STRETCH  - SOLEUS -  Repeat 3 Times, Hold 30 Seconds, Perform 2 Times a Day    STANDING HAMSTRING STRETCH - PROPPED -  Repeat 3 Times, Hold 30 Seconds, Perform 2 Times a Day

## 2022-09-30 ENCOUNTER — OFFICE VISIT (OUTPATIENT)
Dept: INTERNAL MEDICINE | Facility: CLINIC | Age: 57
End: 2022-09-30
Payer: COMMERCIAL

## 2022-09-30 VITALS
TEMPERATURE: 97 F | HEIGHT: 63 IN | SYSTOLIC BLOOD PRESSURE: 120 MMHG | DIASTOLIC BLOOD PRESSURE: 80 MMHG | WEIGHT: 131.81 LBS | BODY MASS INDEX: 23.36 KG/M2 | HEART RATE: 60 BPM | OXYGEN SATURATION: 96 %

## 2022-09-30 DIAGNOSIS — Z12.11 COLON CANCER SCREENING: ICD-10-CM

## 2022-09-30 DIAGNOSIS — Z00.00 ROUTINE GENERAL MEDICAL EXAMINATION AT HEALTH CARE FACILITY: Primary | ICD-10-CM

## 2022-09-30 PROCEDURE — 90686 IIV4 VACC NO PRSV 0.5 ML IM: CPT | Mod: S$GLB,,, | Performed by: NURSE PRACTITIONER

## 2022-09-30 PROCEDURE — 90686 FLU VACCINE (QUAD) GREATER THAN OR EQUAL TO 3YO PRESERVATIVE FREE IM: ICD-10-PCS | Mod: S$GLB,,, | Performed by: NURSE PRACTITIONER

## 2022-09-30 PROCEDURE — 99999 PR PBB SHADOW E&M-EST. PATIENT-LVL IV: ICD-10-PCS | Mod: PBBFAC,,, | Performed by: NURSE PRACTITIONER

## 2022-09-30 PROCEDURE — 90471 IMMUNIZATION ADMIN: CPT | Mod: S$GLB,,, | Performed by: NURSE PRACTITIONER

## 2022-09-30 PROCEDURE — 99396 PREV VISIT EST AGE 40-64: CPT | Mod: 25,S$GLB,, | Performed by: NURSE PRACTITIONER

## 2022-09-30 PROCEDURE — 99999 PR PBB SHADOW E&M-EST. PATIENT-LVL IV: CPT | Mod: PBBFAC,,, | Performed by: NURSE PRACTITIONER

## 2022-09-30 PROCEDURE — 90471 FLU VACCINE (QUAD) GREATER THAN OR EQUAL TO 3YO PRESERVATIVE FREE IM: ICD-10-PCS | Mod: S$GLB,,, | Performed by: NURSE PRACTITIONER

## 2022-09-30 PROCEDURE — 99396 PR PREVENTIVE VISIT,EST,40-64: ICD-10-PCS | Mod: 25,S$GLB,, | Performed by: NURSE PRACTITIONER

## 2022-09-30 NOTE — PROGRESS NOTES
"Subjective:       Patient ID: Jemima Aguillon is a 57 y.o. female.    Chief Complaint: Annual Exam    Pt presents to clinic today for annual exam  Overall doing well since last visit with Dr. Amaya  No chronic meds  Has allergy to peanuts and soy    Follows with [facility] for pap and mammogram  Not due until Dec      /80   Pulse 60   Temp 97.1 °F (36.2 °C) (Temporal)   Ht 5' 2.5" (1.588 m)   Wt 59.8 kg (131 lb 13.4 oz)   SpO2 96%   BMI 23.73 kg/m²     Review of Systems   Constitutional:  Negative for activity change, appetite change, chills, diaphoresis, fatigue, fever and unexpected weight change.   HENT: Negative.  Negative for hearing loss, rhinorrhea and trouble swallowing.    Eyes:  Negative for discharge and visual disturbance.   Respiratory:  Negative for cough, chest tightness, shortness of breath and wheezing.    Cardiovascular:  Negative for chest pain, palpitations and leg swelling.   Gastrointestinal:  Negative for abdominal distention, abdominal pain, blood in stool, constipation, diarrhea, nausea and vomiting.   Endocrine: Negative for polydipsia and polyuria.   Genitourinary:  Negative for decreased urine volume, difficulty urinating, dysuria, frequency, hematuria, menstrual problem and urgency.   Musculoskeletal:  Negative for arthralgias, joint swelling and neck pain.   Neurological: Negative.  Negative for dizziness, syncope, speech difficulty, weakness, light-headedness and headaches.   Psychiatric/Behavioral:  Negative for agitation, confusion, dysphoric mood and hallucinations. The patient is not nervous/anxious.      Objective:      Physical Exam  Vitals reviewed.   Constitutional:       General: She is not in acute distress.     Appearance: Normal appearance. She is normal weight.   HENT:      Head: Normocephalic.      Right Ear: Tympanic membrane normal.      Left Ear: Tympanic membrane normal.      Nose: Nose normal.   Eyes:      Conjunctiva/sclera: Conjunctivae normal.      Pupils: " Pupils are equal, round, and reactive to light.   Cardiovascular:      Rate and Rhythm: Normal rate.      Pulses: Normal pulses.   Pulmonary:      Effort: Pulmonary effort is normal.   Abdominal:      General: Abdomen is flat.      Palpations: Abdomen is soft.   Musculoskeletal:         General: Normal range of motion.      Cervical back: Normal range of motion.   Skin:     General: Skin is warm.      Capillary Refill: Capillary refill takes less than 2 seconds.   Neurological:      Mental Status: She is alert and oriented to person, place, and time. Mental status is at baseline.   Psychiatric:         Mood and Affect: Mood normal.       Assessment:       1. Routine general medical examination at health care facility    2. Colon cancer screening    3. BMI 23.0-23.9, adult          Plan:       Jemima was seen today for annual exam.    Diagnoses and all orders for this visit:    Routine general medical examination at health care facility  -     CBC Auto Differential; Future  -     Comprehensive Metabolic Panel; Future  -     TSH; Future  -     Lipid Panel; Future  - HM for age discussed     Colon cancer screening  -     Cologuard Screening (Multitarget Stool DNA); Future  -     Cologuard Screening (Multitarget Stool DNA)    BMI 23.0-23.9, adult    Vitals reviewed and stable. BP within normal range at 120/80. Labs due in oct     Patient is to continue eating a well balanced diet.      She is encouraged to continue to engage in exercise outside of her day to day activities.      Patient advised to have colon cancer screening. Order placed for cologuard     Questions and concerns addressed.      Follow up in 1 year or PRN.

## 2022-10-31 ENCOUNTER — LAB VISIT (OUTPATIENT)
Dept: LAB | Facility: HOSPITAL | Age: 57
End: 2022-10-31
Attending: NURSE PRACTITIONER
Payer: COMMERCIAL

## 2022-10-31 DIAGNOSIS — Z00.00 ROUTINE GENERAL MEDICAL EXAMINATION AT HEALTH CARE FACILITY: ICD-10-CM

## 2022-10-31 LAB
ALBUMIN SERPL BCP-MCNC: 4 G/DL (ref 3.5–5.2)
ALP SERPL-CCNC: 69 U/L (ref 55–135)
ALT SERPL W/O P-5'-P-CCNC: 20 U/L (ref 10–44)
ANION GAP SERPL CALC-SCNC: 11 MMOL/L (ref 8–16)
AST SERPL-CCNC: 29 U/L (ref 10–40)
BASOPHILS # BLD AUTO: 0.06 K/UL (ref 0–0.2)
BASOPHILS NFR BLD: 1.4 % (ref 0–1.9)
BILIRUB SERPL-MCNC: 0.4 MG/DL (ref 0.1–1)
BUN SERPL-MCNC: 12 MG/DL (ref 6–20)
CALCIUM SERPL-MCNC: 9.3 MG/DL (ref 8.7–10.5)
CHLORIDE SERPL-SCNC: 105 MMOL/L (ref 95–110)
CHOLEST SERPL-MCNC: 180 MG/DL (ref 120–199)
CHOLEST/HDLC SERPL: 2.6 {RATIO} (ref 2–5)
CO2 SERPL-SCNC: 26 MMOL/L (ref 23–29)
CREAT SERPL-MCNC: 0.8 MG/DL (ref 0.5–1.4)
DIFFERENTIAL METHOD: NORMAL
EOSINOPHIL # BLD AUTO: 0.2 K/UL (ref 0–0.5)
EOSINOPHIL NFR BLD: 3.8 % (ref 0–8)
ERYTHROCYTE [DISTWIDTH] IN BLOOD BY AUTOMATED COUNT: 13.1 % (ref 11.5–14.5)
EST. GFR  (NO RACE VARIABLE): >60 ML/MIN/1.73 M^2
GLUCOSE SERPL-MCNC: 75 MG/DL (ref 70–110)
HCT VFR BLD AUTO: 43.9 % (ref 37–48.5)
HDLC SERPL-MCNC: 69 MG/DL (ref 40–75)
HDLC SERPL: 38.3 % (ref 20–50)
HGB BLD-MCNC: 14.1 G/DL (ref 12–16)
IMM GRANULOCYTES # BLD AUTO: 0.01 K/UL (ref 0–0.04)
IMM GRANULOCYTES NFR BLD AUTO: 0.2 % (ref 0–0.5)
LDLC SERPL CALC-MCNC: 100 MG/DL (ref 63–159)
LYMPHOCYTES # BLD AUTO: 1.4 K/UL (ref 1–4.8)
LYMPHOCYTES NFR BLD: 31.5 % (ref 18–48)
MCH RBC QN AUTO: 29.6 PG (ref 27–31)
MCHC RBC AUTO-ENTMCNC: 32.1 G/DL (ref 32–36)
MCV RBC AUTO: 92 FL (ref 82–98)
MONOCYTES # BLD AUTO: 0.4 K/UL (ref 0.3–1)
MONOCYTES NFR BLD: 9.9 % (ref 4–15)
NEUTROPHILS # BLD AUTO: 2.4 K/UL (ref 1.8–7.7)
NEUTROPHILS NFR BLD: 53.2 % (ref 38–73)
NONHDLC SERPL-MCNC: 111 MG/DL
NRBC BLD-RTO: 0 /100 WBC
PLATELET # BLD AUTO: 262 K/UL (ref 150–450)
PMV BLD AUTO: 10.6 FL (ref 9.2–12.9)
POTASSIUM SERPL-SCNC: 4.3 MMOL/L (ref 3.5–5.1)
PROT SERPL-MCNC: 7 G/DL (ref 6–8.4)
RBC # BLD AUTO: 4.76 M/UL (ref 4–5.4)
SODIUM SERPL-SCNC: 142 MMOL/L (ref 136–145)
TRIGL SERPL-MCNC: 55 MG/DL (ref 30–150)
TSH SERPL DL<=0.005 MIU/L-ACNC: 1.59 UIU/ML (ref 0.4–4)
WBC # BLD AUTO: 4.44 K/UL (ref 3.9–12.7)

## 2022-10-31 PROCEDURE — 85025 COMPLETE CBC W/AUTO DIFF WBC: CPT | Performed by: NURSE PRACTITIONER

## 2022-10-31 PROCEDURE — 80053 COMPREHEN METABOLIC PANEL: CPT | Performed by: NURSE PRACTITIONER

## 2022-10-31 PROCEDURE — 80061 LIPID PANEL: CPT | Performed by: NURSE PRACTITIONER

## 2022-10-31 PROCEDURE — 36415 COLL VENOUS BLD VENIPUNCTURE: CPT | Mod: PO | Performed by: NURSE PRACTITIONER

## 2022-10-31 PROCEDURE — 84443 ASSAY THYROID STIM HORMONE: CPT | Performed by: NURSE PRACTITIONER

## 2022-11-20 LAB — NONINV COLON CA DNA+OCC BLD SCRN STL QL: NEGATIVE

## 2022-12-05 ENCOUNTER — PATIENT MESSAGE (OUTPATIENT)
Dept: REHABILITATION | Facility: HOSPITAL | Age: 57
End: 2022-12-05
Payer: COMMERCIAL

## 2022-12-07 DIAGNOSIS — Z12.31 OTHER SCREENING MAMMOGRAM: ICD-10-CM

## 2022-12-08 ENCOUNTER — PATIENT MESSAGE (OUTPATIENT)
Dept: ORTHOPEDICS | Facility: CLINIC | Age: 57
End: 2022-12-08
Payer: COMMERCIAL

## 2022-12-14 ENCOUNTER — OFFICE VISIT (OUTPATIENT)
Dept: ORTHOPEDICS | Facility: CLINIC | Age: 57
End: 2022-12-14
Payer: COMMERCIAL

## 2022-12-14 VITALS — HEIGHT: 63 IN | BODY MASS INDEX: 23.21 KG/M2 | WEIGHT: 131 LBS

## 2022-12-14 DIAGNOSIS — M25.551 RIGHT HIP PAIN: Primary | ICD-10-CM

## 2022-12-14 DIAGNOSIS — G57.01 PIRIFORMIS SYNDROME OF RIGHT SIDE: ICD-10-CM

## 2022-12-14 PROCEDURE — 99213 PR OFFICE/OUTPT VISIT, EST, LEVL III, 20-29 MIN: ICD-10-PCS | Mod: S$GLB,,, | Performed by: PHYSICIAN ASSISTANT

## 2022-12-14 PROCEDURE — 99999 PR PBB SHADOW E&M-EST. PATIENT-LVL III: CPT | Mod: PBBFAC,,, | Performed by: PHYSICIAN ASSISTANT

## 2022-12-14 PROCEDURE — 99999 PR PBB SHADOW E&M-EST. PATIENT-LVL III: ICD-10-PCS | Mod: PBBFAC,,, | Performed by: PHYSICIAN ASSISTANT

## 2022-12-14 PROCEDURE — 99213 OFFICE O/P EST LOW 20 MIN: CPT | Mod: S$GLB,,, | Performed by: PHYSICIAN ASSISTANT

## 2022-12-14 NOTE — PROGRESS NOTES
Orthopaedic Follow-Up Visit    Last Appointment: 6/07/2022  Diagnosis: Greater trochanteric bursitis of right hip  Prior Procedure: CSI into trochanteric bursa, physical therapy    Jemima Aguillon is a 57 y.o. female who is here for f/u evaluation of the RIGHT hip. The patient was last seen here by Dr. Cedillo on 6/07/2022 at which point we decided to send her for a CSI and physical therapy here at [facility] for 2 weeks prior to considering further treatment options. The patient returns today reporting that the symptoms have returned following physical therapy and is interested in proceeding with continued physical therapy.     To review her history, Jemima Aguillon is a 56 y.o. female who presents with right hip pain and dysfunction. Onset of the symptoms was 2019. Inciting event: Patient was hiking and slippedd on a rock. She has had issues with tightness and discomfort since. Current symptoms include pain in the right hip and buttock, pain quality is intermittent aching and throbbing pain along with sharp, shooting pain that radiates down her right leg. Pain is aggravated by sitting too long. Evaluation to date: x-rays. Treatment to date: activity modification, NSAIDs, stretching.    Patient's medications, allergies, past medical, surgical, social and family histories were reviewed and updated as appropriate.    Review of Systems   All systems reviewed were negative.  Specifically, the patient denies fever, chills, weight loss, chest pain, shortness of breath, or dyspnea on exertion.      Past Medical History:   Diagnosis Date    Allergy        Objective:      Physical Exam  Patient is alert and oriented, no distress. Skin is intact. Neuro is normal with no focal motor or sensory findings.    Standing exam  stance: normal alignment, no significant leg-length discrepancy  gait: no limp    Right Hip:  Inspection: normal    Palpation tenderness: Posterior, ttp piriformis    Range of motion: 120 deg  Flexion         20 deg IR         40 deg ER    ROM equal bilaterally    Strength:  5/5 Flexion    5/5 Abduction    5/5 Adduction    Impingement Tests: Negative TORSTEN     Positive FADIR    Other Tests:  Negative Log Roll  Negative Circumduction  Positive Piriformis     Negative Crow's          N/V Exam:  Tibial:    Normal sensory (plantar foot)  Normal motor (FHL)    Sup Peroneal:  Normal sensory (dorsal foot)  Normal motor (Peroneals)            Deep Peroneal:  Normal sensory (1st web space)  Normal motor (EHL)    Sural:   Normal sensory (lateral foot)   Saphenous:   Normal sensory (medial lower leg)   Normal pedal pulses, warm and well perfused with capillary refill < 2 sec       Neurovascular exam  - motor function grossly intact bilaterally to hip flexion, knee extension and flexion, ankle dorsiflexion and plantarflexion  - sensation intact to light touch bilaterally to femoral, tibial, tibial and peroneal distributions  - symmetrical pedal pulses    Imaging:   XR Results:  EXAMINATION:  XR HIP WITH PELVIS WHEN PERFORMED, 2 OR 3  VIEWS RIGHT     CLINICAL HISTORY:  Pain in right hip     TECHNIQUE:  AP view of the pelvis and frog leg lateral view of the right hip were performed.     COMPARISON:  None     FINDINGS:  Hip joints are symmetric in appearance.  No evidence of AVN.  No fracture or dislocation.  No significant joint space narrowing.  No soft tissue abnormality.     Impression:     See above        Electronically signed by: Ajnel Ochoa MD  Date:                                            06/07/2022  Time:                                           10:18        Physician Read: I agree with the above impression.    Assessment/Plan:   Assessment:  Jemima Aguillon is a 57 y.o. female with right-sided piriformis syndrome    Plan:    Discussed diagnosis and treatment options patient today. Her history, physical exam, and imaging is consistent with right-sided piriformis syndrome.  She has had previous  flare-ups of this in the past and has done well with physical therapy.  I recommend we proceed with a referral to formal physical therapy to work on strengthening of the muscles surrounding the hip.  Physical therapy referral placed to [facility] with Leidy Jamil, PT  She is unable to take anti-inflammatories due to pre-existing medical conditions, encouraged her to take Tylenol as needed with the pain. Ok to ice the affected area.  Follow-up with me in 2 months if no improvement of symptoms        Elsa Cartagena PA-C  Sports Medicine Physician Assistant       Disclaimer: This note was prepared using a voice recognition system and is likely to have sound alike errors within the text.

## 2023-01-05 ENCOUNTER — CLINICAL SUPPORT (OUTPATIENT)
Dept: REHABILITATION | Facility: HOSPITAL | Age: 58
End: 2023-01-05
Payer: COMMERCIAL

## 2023-01-05 DIAGNOSIS — R29.898 DECREASED STRENGTH OF LOWER EXTREMITY: Primary | ICD-10-CM

## 2023-01-05 DIAGNOSIS — G57.01 PIRIFORMIS SYNDROME OF RIGHT SIDE: ICD-10-CM

## 2023-01-05 DIAGNOSIS — R29.898 IMPAIRED FLEXIBILITY OF LOWER EXTREMITY: ICD-10-CM

## 2023-01-05 PROCEDURE — 97161 PT EVAL LOW COMPLEX 20 MIN: CPT

## 2023-01-05 PROCEDURE — 97110 THERAPEUTIC EXERCISES: CPT

## 2023-01-05 NOTE — PATIENT INSTRUCTIONS
LBP -  Acute- Mobility [BLNX2Y5]    DOUBLE KNEE TO CHEST STRETCH - DKTC -  Repeat 10 Times, Hold 5 Seconds, Perform 3 Times a Day    SINGLE KNEE TO CHEST STRETCH - SKTC -  Repeat 10 Times, Hold 5 Seconds, Perform 3 Times a Day    ROTATIONAL QUADRATUS STRETCH -  Repeat 10 Times, Hold 5 Seconds, Perform 3 Times a Day    SEATED LOW BACK STRETCH -  Repeat 10 Times, Hold 5 Seconds, Perform 3 Times a Day    piriformis foam rolling -  Hold 2 Minutes,

## 2023-01-05 NOTE — PLAN OF CARE
[facility]   Physical Therapy Initial Evaluation     Name: Jemima Romero [facility]3] Number: [id number]    Therapy Diagnosis:   Encounter Diagnoses   Name Primary?    Piriformis syndrome of right side     Decreased strength of lower extremity Yes    Impaired flexibility of lower extremity      Physician: Elsa Cartagena PA-C     Physician Orders: PT Eval and Treat  Medical Diagnosis from Referral: Greater trochanteric bursitis of right hip [M70.61]  Evaluation Date: 1/5/2023  Authorization Period Expiration: 6/7/23  Plan of Care Expiration: 8/17/2022    Progress Update: 7/17/2022    Visit # / Visits authorized: 1 / 1     FOTO: Visit #1 - Scored : 1 / 3     PRECAUTIONS: Standard Precautions     MD Visit: no follow up    Time In: 1305  Time Out: 1355  Total Appointment Time (timed & untimed codes): 50 minutes    SUBJECTIVE     Date of onset: 4/17/22    History of current condition - Jemima is a 57 y.o. female who returns to Physical Therapy after [place]  She was previously seen for her right hip back in June in preparation for her hike, but did not return following due to schedule changes.  Her current symptoms include her piriformis pain, sacroiliac joint pain and sciatica down her right leg, and mild low back pain.  Numbness and tingling is present occasionally down her right leg, but she cannot related it to anything outside of prolonged sitting on that particular side.     BRADEN: overuse, repetitive friction  Falls: none  Physician Instructions (per patient): n/a  Other concerns: nothing    Imaging: X-RAY: reviewed    Prior Therapy: N/A  Social History: Pt lives with their spouse  Living Environment: house  ADLs unable to complete: n/a  Gym/Home Equipment: [employer]- [place]- lifting at the gym (back squats and front squats), deadlifts, leg press  Occupation: Pt is  at [employer]- able to do all activities at [employer].   Prior  Level of Function: Independent with all ADLs  Current Level of Function: 30% of PLOF  - cycling  - backpacker.     Pain:  Current 2 /10, worst 7 /10, best 1 /10   Location: Right Hip   Description: Aching, Dull and Shooting  Aggravating Factors: walking (stiff), the day after leg day. Split squats- all feels like strength. Single leg standing on right first thing in the morning, sitting prolonged.  Easing Factors: stretching, tylenol    Pts goals: Pt reported goals are to get back to cycling and backpacking as well as all other recreational activities without pain or discomfort.     _______________________________________________________    Medical History:   Past Medical History:   Diagnosis Date    Allergy        Surgical History:   Jemima Aguillon  has a past surgical history that includes Ureteroscopy;  section; Oophorectomy (); and Hysterectomy ().    Medications:   Jemima has a current medication list which includes the following prescription(s): epinephrine and valacyclovir.    Allergies:   Review of patient's allergies indicates:   Allergen Reactions    Peanut Anaphylaxis, Anxiety, Diarrhea, Hives, Itching, Nausea And Vomiting, Palpitations, Shortness Of Breath and Swelling    Soy Anaphylaxis, Anxiety, Diarrhea, Hives, Itching, Nausea And Vomiting, Other (See Comments), Palpitations, Shortness Of Breath and Swelling    Corn containing products Other (See Comments)    Wheat containing prod         OBJECTIVE   (x = not tested due to pain and/or inability to obtain test position)    RANGE OF MOTION:      Lumbar   Range of Motion Right  (spine) Left Function   & Pain Goal   Lumbar Flexion (60º) Centralized symptoms  [x]Functional  []Dysfunctional  []Painful  [x]Non-Painful 50º  Functional   Nonpainful   Lumbar Extension (30º) Peripheral symptoms  []Functional  [x]Dysfunctional  [x]Painful  []Non-Painful 20º  Functional   Nonpainful   Lumbar Side Bending (25º) No change No change  [x]Functional  []Dysfunctional  []Painful  [x]Non-Painful 20º  Functional   Nonpainful   Lumbar Rotation No change No change [x]Functional  []Dysfunctional  []Painful  [x]Non-Painful 45º  Functional   Nonpainful   - repetitive flexion- no adverse reactions-    re  - repetitive extension-  adverse reactions, increases and causes radiating pain down into calf.    Releived with flexion- right above knee.     Hip AROM/PROM Right   Left   Goal   Hip Flexion (120) 130 150 115   Hip IR (45) 35 45 40   Hip ER (45) 30 45 40       Lower Extremity  Strength RIGHT   Goal   Hip Flexion  []1  []2  []3  [x]4  []5                []+ []- 5/5 B    Hip Extension  []1  []2  []3  [x]4  []5                []+ []- 5/5 B   Hip Abduction  []1  []2  []3  [x]4  []5                []+ []- 5/5 B   Hip Internal rotaiton  []1  []2  []3  [x]4  []5                []+ []- 5/5 B   Hip External rotation  []1  []2  []3  [x]4  []5                []+ []- 5/5 B   Knee Flexion []1  []2  []3  [x]4  []5                []+ []- 5/5 B   Knee Extension []1  []2  []3  [x]4  []5                []+ []- 5/5 B   Ankle Dorsiflexion []1  []2  []3  [x]4  []5                []+ []- 5/5 B   Ankle Plantarflexion []1  []2  []3  [x]4  []5                []+ []- 5/5 B     Lower Extremity  Strength LEFT   Goal   Hip Flexion  []1  []2  []3  [x]4  [x]5                []+ []- 5/5 B    Hip Extension  []1  []2  []3  [x]4  []5                []+ []- 5/5 B   Hip Abduction  []1  []2  []3  [x]4  []5                []+ []- 5/5 B   Hip Internal rotaiton  []1  []2  []3  [x]4  []5                []+ []- 5/5 B   Hip External rotation  []1  []2  []3  [x]4  []5                []+ []- 5/5 B   Knee Flexion []1  []2  []3  [x]4  []5                []+ []- 5/5 B   Knee Extension []1  []2  []3  [x]4  []5                []+ []- 5/5 B   Ankle Dorsiflexion []1  []2  []3  [x]4  []5                []+ []- 5/5 B   Ankle Plantarflexion []1  []2  []3  [x]4  []5                []+ []- 5/5 B           MUSCLE  LENGTH:     Muscle Tested  Right   Left    Goal   Hip Flexors  decreased decreased Normal B   Quadriceps decreased decreased Normal B   Hamstrings  normal decreased Normal B   Piriformis  decreased   10 inches  decreased   6 inches limited Normal B   Gastrocnemius  decreased decreased Normal B   Soleus  decreased decreased Normal B       Sensation:  Sensation is intact to light touch    Palpation: Increased tone and tenderness noted with palpation to: increased through piriformis and glutes    Posture:  Pt presents with postural abnormalities which include: forward head and rounded shoulders    FUNCTION:     CMS Impairment/Limitation/Restriction for FOTO Hip Survey    Therapist reviewed FOTO scores for Jemima Aguillon on 1/5/2023.   FOTO documents entered into [employer] - see Media section.    Limitation Score: 28%             TREATMENT   Total Treatment time separate from Evaluation: (15) minutes     Jemima received the treatments listed below:      THERAPEUTIC EXERCISES: to develop strength, endurance, ROM, flexibility, posture and core stabilization for (15)  minutes including: x = performed today    TherEx 1/5/2023    ROM/Chondral: Upright Bike next    Seated Piriformis Stretch x Home exercise program review    Lumbar Mobility Program x Double Knee to chest  Single Knee to Chest  Quadratus Lumborum Stretch  Forward Flexion Stretch   Foam Roller x Piriformis  ITband    BOLD = new this visit  Plan for Next Visit: needling into piriformis next visit, soft tissue work to Itband & glutes/piriformis       PATIENT EDUCATION AND HOME EXERCISES     Education/Self-Care provided:  (included in treatment) minutes   Patient educated on the impairments noted above and the effects of physical therapy intervention to improve overall condition and QOL.   Patient was educated on all the above exercise prior/during/after for proper posture, positioning, and execution for safe performance with home exercise program.     Written  Home Exercises Provided: yes. Prefers: Printed Copies  Exercises were reviewed and Jemima was able to demonstrate them prior to the end of the session.  Jemima demonstrated good understanding of the education provided. See EMR under Patient Instructions for exercises provided during therapy sessions.    ASSESSMENT     Jemima is a 57 y.o. female referred to outpatient Physical Therapy with a medical diagnosis of Piriformis syndrome of right side [G57.01].  Jemima presents with clinical signs and symptoms that support this diagnosis with decreased lower extremity range of motion, dysfunctional and painful lumbar mobility, increased muscular tension with decreased flexiblity,  and impaired functional mobility. The above impairments will be addressed through manual therapy techniques, therapeutic exercises, functional training, and modalities as necessary. Patient was treated and educated on exercises for home program, progression of therapy, and benefits of therapy to achieve full functional mobility. Patient will benefit from skilled physical therapy to meet short and long term goals and return to prior level of function.    Pt prognosis is Excellent.   Pt will benefit from skilled outpatient Physical Therapy to address the deficits stated above and in the chart below, provide pt/family education, and to maximize pt's level of independence.     Plan of care discussed with patient: Yes  Pt's spiritual, cultural and educational needs considered and patient is agreeable to the plan of care and goals as stated below:     Anticipated Barriers for therapy: none    Medical Necessity is demonstrated by the following:   History  Co-morbidities and personal factors that may impact the plan of care Co-morbidities:   Past Medical History:   Diagnosis Date    Allergy        Personal Factors:   social background  lifestyle     low   Examination  Body Structures and Functions, activity limitations and participation restrictions that  "may impact the plan of care Body Regions:   lower extremities    Body Systems:    gross symmetry  ROM  strength  gross coordinated movement  balance  gait    Participation Restrictions:   See above in "Current Level of Function"     Activity limitations:   Learning and applying knowledge  no deficits    General Tasks and Commands  no deficits    Communication  no deficits    Mobility  lifting and carrying objects  walking    Self care  dressing    Domestic Life  doing house work (cleaning house, washing dishes, laundry)  assisting others    Interactions/Relationships  no deficits    Life Areas  no deficits    Community and Social Life  community life  recreation and leisure         high   Clinical Presentation stable and uncomplicated low   Decision Making/ Complexity Score: low       GOALS:  SHORT TERM GOALS: 4 weeks, (2/5/2023) Progress   Recent signs and systems trend is improving in order to progress towards LTG's.    Patient will be independent with HEP in order to further progress and return to maximal function.    Pain rating at Worst: 5/10 in order to progress towards increased independence with activity.    Patient will be able to correct postural deviations in sitting and standing, to decrease pain and promote postural awareness for injury prevention.       LONG TERM GOALS: 8 weeks, (3/5/23) Progress   Patient will return to normal ADL, recreational, and work related activities with less pain and limitation.     Patient will improve AROM to stated goals in order to return to maximal functional potential.     Patient will improve Strength to stated goals of appropriate musculature in order to improve functional independence.     Pain Rating at Best: 1/10 to improve Quality of Life.     Patient will meet predicted functional outcome (FOTO) score: 23% to increase self-worth & perceived functional ability.    Patient will have met/partially met personal goal of: getting back to all recreational activities " without discomfort.           PLAN   Plan of care Certification: 1/5/2023 to  3/5/2023    Outpatient Physical Therapy 1-2 times weekly for 8 weeks to include any combination of the following interventions: virtual visits, dry needling, modalities, electrical stimulation (IFC, Pre-Mod, Attended with Functional Dry Needling), Gait Training, Manual Therapy, Moist Heat/ Ice, Neuromuscular Re-ed, Patient Education, Self Care, Therapeutic Exercise, Functional Training and Therapeutic Activites     Thank you for this referral.    Leidy Negron, PT      I CERTIFY THE NEED FOR THESE SERVICES FURNISHED UNDER THIS PLAN OF TREATMENT AND WHILE UNDER MY CARE   Physician's comments:     Physician's Signature: ___________________________________________________

## 2023-01-10 ENCOUNTER — HOSPITAL ENCOUNTER (OUTPATIENT)
Dept: RADIOLOGY | Facility: HOSPITAL | Age: 58
Discharge: HOME | End: 2023-01-10
Attending: INTERNAL MEDICINE
Payer: COMMERCIAL

## 2023-01-10 DIAGNOSIS — Z12.31 OTHER SCREENING MAMMOGRAM: ICD-10-CM

## 2023-01-10 PROCEDURE — 77067 MAMMO DIGITAL SCREENING BILAT WITH TOMO: ICD-10-PCS | Mod: 26,,, | Performed by: RADIOLOGY

## 2023-01-10 PROCEDURE — 77063 MAMMO DIGITAL SCREENING BILAT WITH TOMO: ICD-10-PCS | Mod: 26,,, | Performed by: RADIOLOGY

## 2023-01-10 PROCEDURE — 77067 SCR MAMMO BI INCL CAD: CPT | Mod: 26,,, | Performed by: RADIOLOGY

## 2023-01-10 PROCEDURE — 77063 BREAST TOMOSYNTHESIS BI: CPT | Mod: 26,,, | Performed by: RADIOLOGY

## 2023-01-10 PROCEDURE — 77067 SCR MAMMO BI INCL CAD: CPT | Mod: TC,PO

## 2023-01-12 ENCOUNTER — CLINICAL SUPPORT (OUTPATIENT)
Dept: REHABILITATION | Facility: HOSPITAL | Age: 58
End: 2023-01-12
Payer: COMMERCIAL

## 2023-01-12 DIAGNOSIS — R29.898 DECREASED STRENGTH OF LOWER EXTREMITY: Primary | ICD-10-CM

## 2023-01-12 DIAGNOSIS — R29.898 IMPAIRED FLEXIBILITY OF LOWER EXTREMITY: ICD-10-CM

## 2023-01-12 PROCEDURE — 97140 MANUAL THERAPY 1/> REGIONS: CPT

## 2023-01-12 PROCEDURE — 97110 THERAPEUTIC EXERCISES: CPT

## 2023-01-12 NOTE — PROGRESS NOTES
[facility] OUTPATIENT THERAPY AND WELLNESS   Physical Therapy Treatment Note     Name: Jemima Romero [facility] Number: [id number]    Therapy Diagnosis:   Encounter Diagnoses   Name Primary?    Decreased strength of lower extremity Yes    Impaired flexibility of lower extremity      Physician: Elsa Cartagena PA-C    Visit Date: 1/12/2023    Physician Orders: PT Eval and Treat  Medical Diagnosis from Referral: Greater trochanteric bursitis of right hip [M70.61]  Evaluation Date: 1/5/2023  Authorization Period Expiration: 6/7/23  Plan of Care Expiration: 8/17/2022                Progress Update: 7/17/2022               Visit # / Visits authorized: 1 / 20 (+eval)                       FOTO: Visit #1 - Scored : 1 / 3      PRECAUTIONS: Standard Precautions      MD Visit: no follow up    PTA Visit #: 0 / 5     Time In: 1430  Time Out: 1515  Total Billable Time: 45 minutes    SUBJECTIVE     Pt reports:  She is feeling really good, but wants to trial needling again to work on flexibility and pain  Compliance with Hep: Daily  Response to previous treatment: no adverse reactions to treatment/updated HEP  Functional change: Better    Pain: 0/10   Worst: 5/10  Location: Right HIp      OBJECTIVE     Objective Measures updated at progress report unless specified otherwise.    Treatment     Gym/Equipment Access: full access  Time to Complete Exercises: as expected    Jemima received the treatments listed below:      MANUAL THERAPY TECHNIQUES: Joint mobilizations, Soft tissue Mobilization, and mobilization with movement were applied to the area listed below for (30) minutes, including: x = exercises performed     Manual Intervention 1/12/2023    Soft Tissue Mobilization     Joint Mobilizations     Mobilization with movement     Functional Dry Needling x Location: piriformis, glutes left  Technique: manual & electrical  Response: no adverse reaction    BOLD = new this visit  Plan for Next Visit: repeat as needed      See EMR  under MEDIA for written consent provided previous year 6/2022   Palpation Assessment to determine the necessity for Functional Dry Needling  through posterior gluteal and piriformos musculature. .     ESTIM :: muscle groups 1/12/2023 TIME Intensity (mA)  Details   Piriformis & Glute Combo x 10 min 5-8 mA Cross bracketed   STEM SETTINGS: 2-4 Hz, 150-250 ms, Constant/continuous bi-phasic waveform    THERAPEUTIC EXERCISES: to develop strength, endurance, ROM, flexibility, posture and core stabilization for (10)  minutes including: x = performed today    TherEx 1/12/2023    Mobility/Chondral: Upright Bike x 10 minutes, L4                   BOLD = new this visit  Plan for Next Visit:        ASSESSMENT     Jemima Aguillon tolerated PT session well with minimal  complaints of pain or discomfort, secondary to modifications in activity and exercises. Objective findings are improving with of range of motion, flexibility,  and functional mobility.  Therapy exercises were reviewed by revisiting exercises given from previous home exercise program while adding needling.  Handouts were not issued during today's visit. Jemima demonstrated good understanding of new exercises and will continue to progress at home until next follow-up.       Jemima Is progressing well towards her goals.   Pt prognosis is Good.     Pt will continue to benefit from skilled outpatient physical therapy to address the deficits listed in the problem list box on initial evaluation, provide pt/family education and to maximize pt's level of independence in the home and community environment.     Pt's spiritual, cultural and educational needs considered and pt agreeable to plan of care and goals.     GOALS:  SHORT TERM GOALS: 4 weeks, (2/5/2023) Progress   Recent signs and systems trend is improving in order to progress towards LTG's.     Patient will be independent with HEP in order to further progress and return to maximal function.     Pain rating  at Worst: 5/10 in order to progress towards increased independence with activity.     Patient will be able to correct postural deviations in sitting and standing, to decrease pain and promote postural awareness for injury prevention.         LONG TERM GOALS: 8 weeks, (3/5/23) Progress   Patient will return to normal ADL, recreational, and work related activities with less pain and limitation.      Patient will improve AROM to stated goals in order to return to maximal functional potential.      Patient will improve Strength to stated goals of appropriate musculature in order to improve functional independence.      Pain Rating at Best: 1/10 to improve Quality of Life.      Patient will meet predicted functional outcome (FOTO) score: 23% to increase self-worth & perceived functional ability.     Patient will have met/partially met personal goal of: getting back to all recreational activities without discomfort.           PLAN     Continue Plan of Care (POC) and progress per patient tolerance. See Treatment section for exercise progression.    Leidy Negron, PT

## 2023-04-26 ENCOUNTER — PATIENT MESSAGE (OUTPATIENT)
Dept: INTERNAL MEDICINE | Facility: CLINIC | Age: 58
End: 2023-04-26
Payer: COMMERCIAL

## 2023-10-30 ENCOUNTER — OFFICE VISIT (OUTPATIENT)
Dept: INTERNAL MEDICINE | Facility: CLINIC | Age: 58
End: 2023-10-30
Payer: COMMERCIAL

## 2023-10-30 VITALS
WEIGHT: 136 LBS | OXYGEN SATURATION: 98 % | HEART RATE: 61 BPM | HEIGHT: 63 IN | DIASTOLIC BLOOD PRESSURE: 60 MMHG | SYSTOLIC BLOOD PRESSURE: 128 MMHG | TEMPERATURE: 98 F | BODY MASS INDEX: 24.1 KG/M2

## 2023-10-30 DIAGNOSIS — D68.03: ICD-10-CM

## 2023-10-30 DIAGNOSIS — Z12.31 ENCOUNTER FOR SCREENING MAMMOGRAM FOR HIGH-RISK PATIENT: ICD-10-CM

## 2023-10-30 DIAGNOSIS — Z91.010 PEANUT ALLERGY: ICD-10-CM

## 2023-10-30 DIAGNOSIS — Z00.00 ROUTINE GENERAL MEDICAL EXAMINATION AT HEALTH CARE FACILITY: Primary | ICD-10-CM

## 2023-10-30 PROCEDURE — 90472 TDAP VACCINE GREATER THAN OR EQUAL TO 7YO IM: ICD-10-PCS | Mod: S$GLB,,, | Performed by: INTERNAL MEDICINE

## 2023-10-30 PROCEDURE — 99396 PREV VISIT EST AGE 40-64: CPT | Mod: 25,S$GLB,, | Performed by: INTERNAL MEDICINE

## 2023-10-30 PROCEDURE — 90472 IMMUNIZATION ADMIN EACH ADD: CPT | Mod: S$GLB,,, | Performed by: INTERNAL MEDICINE

## 2023-10-30 PROCEDURE — 90686 IIV4 VACC NO PRSV 0.5 ML IM: CPT | Mod: S$GLB,,, | Performed by: INTERNAL MEDICINE

## 2023-10-30 PROCEDURE — 90715 TDAP VACCINE GREATER THAN OR EQUAL TO 7YO IM: ICD-10-PCS | Mod: S$GLB,,, | Performed by: INTERNAL MEDICINE

## 2023-10-30 PROCEDURE — 90471 IMMUNIZATION ADMIN: CPT | Mod: S$GLB,,, | Performed by: INTERNAL MEDICINE

## 2023-10-30 PROCEDURE — 90715 TDAP VACCINE 7 YRS/> IM: CPT | Mod: S$GLB,,, | Performed by: INTERNAL MEDICINE

## 2023-10-30 PROCEDURE — 99999 PR PBB SHADOW E&M-EST. PATIENT-LVL IV: CPT | Mod: PBBFAC,,, | Performed by: INTERNAL MEDICINE

## 2023-10-30 PROCEDURE — 90686 FLU VACCINE (QUAD) GREATER THAN OR EQUAL TO 3YO PRESERVATIVE FREE IM: ICD-10-PCS | Mod: S$GLB,,, | Performed by: INTERNAL MEDICINE

## 2023-10-30 PROCEDURE — 90471 FLU VACCINE (QUAD) GREATER THAN OR EQUAL TO 3YO PRESERVATIVE FREE IM: ICD-10-PCS | Mod: S$GLB,,, | Performed by: INTERNAL MEDICINE

## 2023-10-30 PROCEDURE — 99396 PR PREVENTIVE VISIT,EST,40-64: ICD-10-PCS | Mod: 25,S$GLB,, | Performed by: INTERNAL MEDICINE

## 2023-10-30 PROCEDURE — 99999 PR PBB SHADOW E&M-EST. PATIENT-LVL IV: ICD-10-PCS | Mod: PBBFAC,,, | Performed by: INTERNAL MEDICINE

## 2023-10-30 RX ORDER — EPINEPHRINE 0.3 MG/.3ML
1 INJECTION SUBCUTANEOUS ONCE
Qty: 2 EACH | Refills: 3 | Status: SHIPPED | OUTPATIENT
Start: 2023-10-30 | End: 2023-10-30

## 2023-10-30 NOTE — PROGRESS NOTES
Pt came in for vaccine (DTAP). Instructed patient to wait 15 mins before leaving. Pt states understanding. VIS given. Administered injection in the left deltoid. Patient tolerated well. No complaints/concerns noted.        Pt also received low dose Flu

## 2023-10-30 NOTE — PROGRESS NOTES
"Subjective:       Patient ID: Jemima Aguillon is a 58 y.o. female.    Chief Complaint: Annual Exam      HPI:  Patient is a 58-year-old female presenting today for updated physical exam review of chronic health issues.  Patient has history of von Willebrand's disease, peanut and soy allergies.  Patient indicates she is doing well.  She is not had any significant issues speak of.  She is not had any problems with recent bleeding extra activities or any problems with her allergies.  She does request a prescription for updated EpiPen as it has been awhile since she is had 1.    Review of Systems   Constitutional:  Negative for activity change, chills, fever and unexpected weight change.   HENT:  Negative for hearing loss, rhinorrhea and trouble swallowing.    Eyes:  Negative for photophobia, discharge and visual disturbance.   Respiratory:  Negative for cough, chest tightness, shortness of breath and wheezing.    Cardiovascular:  Negative for chest pain and palpitations.   Gastrointestinal:  Negative for blood in stool, constipation, diarrhea, nausea and vomiting.   Endocrine: Negative for polydipsia and polyuria.   Genitourinary:  Negative for difficulty urinating, dysuria, hematuria and menstrual problem.   Musculoskeletal:  Negative for arthralgias, joint swelling, neck pain and neck stiffness.   Skin:  Negative for rash.   Neurological:  Negative for syncope, weakness, light-headedness and headaches.   Hematological:  Negative for adenopathy.   Psychiatric/Behavioral:  Negative for confusion and dysphoric mood. The patient is not nervous/anxious.        Objective:   /60   Pulse 61   Temp 97.5 °F (36.4 °C) (Tympanic)   Ht 5' 2.5" (1.588 m)   Wt 61.7 kg (136 lb 0.4 oz)   SpO2 98%   BMI 24.48 kg/m²      Physical Exam  Vitals reviewed.   Constitutional:       Appearance: Normal appearance. She is well-developed. She is not ill-appearing.   HENT:      Head: Normocephalic and atraumatic.      Right " Ear: Tympanic membrane, ear canal and external ear normal.      Left Ear: Tympanic membrane, ear canal and external ear normal.   Eyes:      Pupils: Pupils are equal, round, and reactive to light.   Neck:      Thyroid: No thyromegaly.   Cardiovascular:      Rate and Rhythm: Normal rate and regular rhythm.      Heart sounds: No murmur heard.     No friction rub. No gallop.   Pulmonary:      Effort: Pulmonary effort is normal.      Breath sounds: Normal breath sounds. No wheezing or rales.   Chest:      Chest wall: No tenderness.   Abdominal:      General: Abdomen is flat. Bowel sounds are normal. There is no distension.      Palpations: Abdomen is soft. There is no mass.      Tenderness: There is no abdominal tenderness. There is no guarding or rebound.   Musculoskeletal:      Cervical back: Normal range of motion and neck supple.   Lymphadenopathy:      Cervical: No cervical adenopathy.   Skin:     General: Skin is warm and dry.      Findings: No rash.   Neurological:      General: No focal deficit present.      Mental Status: She is alert and oriented to person, place, and time.      Cranial Nerves: No cranial nerve deficit.      Deep Tendon Reflexes: Reflexes are normal and symmetric. Reflexes normal.   Psychiatric:         Behavior: Behavior normal.         Judgment: Judgment normal.             Assessment:       1. Routine general medical examination at health care facility    2. Von Willebrand disease, type III    3. Peanut allergy    4. Encounter for screening mammogram for high-risk patient        Plan:   No problem-specific Assessment & Plan notes found for this encounter.    Routine general medical examination at [facility] care facility  Comments:  Focus on good health habits, regular exercise, seatbelt use, sunscreen use, low carb diet.  Orders:  -     CBC Auto Differential; Future; Expected date: 11/03/2023  -     Comprehensive Metabolic Panel; Future; Expected date: 11/03/2023  -     Lipid Panel; Future;  Expected date: 11/03/2023  -     TSH; Future; Expected date: 11/03/2023    Von Willebrand disease, type III  Comments:  stable, no bleeding issues.    Peanut allergy  Comments:  refill epi pen  Orders:  -     EPINEPHrine (EPIPEN 2-AGAPITO) 0.3 mg/0.3 mL AtIn; Inject 0.3 mLs (0.3 mg total) into the muscle once. for 1 dose  Dispense: 2 each; Refill: 3    Encounter for screening mammogram for high-risk patient  -     Mammo Digital Screening Bilat w/ Ron; Future; Expected date: 01/12/2024    Other orders  -     (In Office Administered) Tdap Vaccine  -     Influenza - Quadrivalent (PF)          Follow up in about 1 year (around 10/30/2024).

## 2023-11-02 ENCOUNTER — TELEPHONE (OUTPATIENT)
Dept: INTERNAL MEDICINE | Facility: CLINIC | Age: 58
End: 2023-11-02
Payer: COMMERCIAL

## 2023-11-02 DIAGNOSIS — Z00.00 ROUTINE GENERAL MEDICAL EXAMINATION AT HEALTH CARE FACILITY: Primary | ICD-10-CM

## 2023-11-02 NOTE — TELEPHONE ENCOUNTER
----- Message from Maria A Schreiber sent at 11/2/2023 10:36 AM CDT -----  Regarding: lab orders  Good morning,     Pt came to do blood work but she remembered that she ate and did not realize that they were fasting labs.  Can someone put the orders back in and schedule for tomorrow please?      Thanks!

## 2023-11-03 ENCOUNTER — TELEPHONE (OUTPATIENT)
Dept: INTERNAL MEDICINE | Facility: CLINIC | Age: 58
End: 2023-11-03
Payer: COMMERCIAL

## 2023-11-03 ENCOUNTER — LAB VISIT (OUTPATIENT)
Dept: LAB | Facility: HOSPITAL | Age: 58
End: 2023-11-03
Attending: INTERNAL MEDICINE
Payer: COMMERCIAL

## 2023-11-03 DIAGNOSIS — Z00.00 ROUTINE GENERAL MEDICAL EXAMINATION AT HEALTH CARE FACILITY: ICD-10-CM

## 2023-11-03 LAB
ALBUMIN SERPL BCP-MCNC: 4 G/DL (ref 3.5–5.2)
ALP SERPL-CCNC: 76 U/L (ref 55–135)
ALT SERPL W/O P-5'-P-CCNC: 28 U/L (ref 10–44)
ANION GAP SERPL CALC-SCNC: 14 MMOL/L (ref 8–16)
AST SERPL-CCNC: 34 U/L (ref 10–40)
BASOPHILS # BLD AUTO: 0.08 K/UL (ref 0–0.2)
BASOPHILS NFR BLD: 1.4 % (ref 0–1.9)
BILIRUB SERPL-MCNC: 0.3 MG/DL (ref 0.1–1)
BUN SERPL-MCNC: 20 MG/DL (ref 6–20)
CALCIUM SERPL-MCNC: 9.2 MG/DL (ref 8.7–10.5)
CHLORIDE SERPL-SCNC: 107 MMOL/L (ref 95–110)
CHOLEST SERPL-MCNC: 191 MG/DL (ref 120–199)
CHOLEST/HDLC SERPL: 2.6 {RATIO} (ref 2–5)
CO2 SERPL-SCNC: 23 MMOL/L (ref 23–29)
CREAT SERPL-MCNC: 0.9 MG/DL (ref 0.5–1.4)
DIFFERENTIAL METHOD: ABNORMAL
EOSINOPHIL # BLD AUTO: 0.3 K/UL (ref 0–0.5)
EOSINOPHIL NFR BLD: 5.2 % (ref 0–8)
ERYTHROCYTE [DISTWIDTH] IN BLOOD BY AUTOMATED COUNT: 14.1 % (ref 11.5–14.5)
EST. GFR  (NO RACE VARIABLE): >60 ML/MIN/1.73 M^2
GLUCOSE SERPL-MCNC: 74 MG/DL (ref 70–110)
HCT VFR BLD AUTO: 41.9 % (ref 37–48.5)
HDLC SERPL-MCNC: 74 MG/DL (ref 40–75)
HDLC SERPL: 38.7 % (ref 20–50)
HGB BLD-MCNC: 13 G/DL (ref 12–16)
IMM GRANULOCYTES # BLD AUTO: 0.01 K/UL (ref 0–0.04)
IMM GRANULOCYTES NFR BLD AUTO: 0.2 % (ref 0–0.5)
LDLC SERPL CALC-MCNC: 104.4 MG/DL (ref 63–159)
LYMPHOCYTES # BLD AUTO: 1.8 K/UL (ref 1–4.8)
LYMPHOCYTES NFR BLD: 31.1 % (ref 18–48)
MCH RBC QN AUTO: 29.1 PG (ref 27–31)
MCHC RBC AUTO-ENTMCNC: 31 G/DL (ref 32–36)
MCV RBC AUTO: 94 FL (ref 82–98)
MONOCYTES # BLD AUTO: 0.5 K/UL (ref 0.3–1)
MONOCYTES NFR BLD: 8.4 % (ref 4–15)
NEUTROPHILS # BLD AUTO: 3 K/UL (ref 1.8–7.7)
NEUTROPHILS NFR BLD: 53.7 % (ref 38–73)
NONHDLC SERPL-MCNC: 117 MG/DL
NRBC BLD-RTO: 0 /100 WBC
PLATELET # BLD AUTO: 250 K/UL (ref 150–450)
PMV BLD AUTO: 10.6 FL (ref 9.2–12.9)
POTASSIUM SERPL-SCNC: 4.1 MMOL/L (ref 3.5–5.1)
PROT SERPL-MCNC: 7.1 G/DL (ref 6–8.4)
RBC # BLD AUTO: 4.47 M/UL (ref 4–5.4)
SODIUM SERPL-SCNC: 144 MMOL/L (ref 136–145)
TRIGL SERPL-MCNC: 63 MG/DL (ref 30–150)
TSH SERPL DL<=0.005 MIU/L-ACNC: 1.79 UIU/ML (ref 0.4–4)
WBC # BLD AUTO: 5.62 K/UL (ref 3.9–12.7)

## 2023-11-03 PROCEDURE — 80061 LIPID PANEL: CPT | Performed by: INTERNAL MEDICINE

## 2023-11-03 PROCEDURE — 36415 COLL VENOUS BLD VENIPUNCTURE: CPT | Mod: PO | Performed by: INTERNAL MEDICINE

## 2023-11-03 PROCEDURE — 84443 ASSAY THYROID STIM HORMONE: CPT | Performed by: INTERNAL MEDICINE

## 2023-11-03 PROCEDURE — 80053 COMPREHEN METABOLIC PANEL: CPT | Performed by: INTERNAL MEDICINE

## 2023-11-03 PROCEDURE — 85025 COMPLETE CBC W/AUTO DIFF WBC: CPT | Performed by: INTERNAL MEDICINE

## 2024-02-02 ENCOUNTER — OFFICE VISIT (OUTPATIENT)
Dept: OBSTETRICS AND GYNECOLOGY | Facility: CLINIC | Age: 59
End: 2024-02-02
Payer: COMMERCIAL

## 2024-02-02 ENCOUNTER — LAB VISIT (OUTPATIENT)
Dept: LAB | Facility: HOSPITAL | Age: 59
End: 2024-02-02
Attending: NURSE PRACTITIONER
Payer: COMMERCIAL

## 2024-02-02 VITALS
DIASTOLIC BLOOD PRESSURE: 59 MMHG | HEIGHT: 62 IN | SYSTOLIC BLOOD PRESSURE: 119 MMHG | BODY MASS INDEX: 25.6 KG/M2 | WEIGHT: 139.13 LBS

## 2024-02-02 DIAGNOSIS — R63.5 WEIGHT GAIN: ICD-10-CM

## 2024-02-02 DIAGNOSIS — N95.9 MENOPAUSAL AND POSTMENOPAUSAL DISORDER: ICD-10-CM

## 2024-02-02 DIAGNOSIS — R53.83 FATIGUE, UNSPECIFIED TYPE: ICD-10-CM

## 2024-02-02 DIAGNOSIS — Z01.419 ENCOUNTER FOR GYNECOLOGICAL EXAMINATION WITHOUT ABNORMAL FINDING: Primary | ICD-10-CM

## 2024-02-02 PROCEDURE — 82670 ASSAY OF TOTAL ESTRADIOL: CPT | Performed by: NURSE PRACTITIONER

## 2024-02-02 PROCEDURE — 99396 PREV VISIT EST AGE 40-64: CPT | Mod: S$GLB,,, | Performed by: NURSE PRACTITIONER

## 2024-02-02 PROCEDURE — 84144 ASSAY OF PROGESTERONE: CPT | Performed by: NURSE PRACTITIONER

## 2024-02-02 PROCEDURE — 99999 PR PBB SHADOW E&M-EST. PATIENT-LVL III: CPT | Mod: PBBFAC,,, | Performed by: NURSE PRACTITIONER

## 2024-02-02 PROCEDURE — 83036 HEMOGLOBIN GLYCOSYLATED A1C: CPT | Performed by: NURSE PRACTITIONER

## 2024-02-02 PROCEDURE — 82306 VITAMIN D 25 HYDROXY: CPT | Performed by: NURSE PRACTITIONER

## 2024-02-02 PROCEDURE — 36415 COLL VENOUS BLD VENIPUNCTURE: CPT | Mod: PO | Performed by: NURSE PRACTITIONER

## 2024-02-02 PROCEDURE — 82627 DEHYDROEPIANDROSTERONE: CPT | Performed by: NURSE PRACTITIONER

## 2024-02-02 PROCEDURE — 84403 ASSAY OF TOTAL TESTOSTERONE: CPT | Performed by: NURSE PRACTITIONER

## 2024-02-02 NOTE — PROGRESS NOTES
Subjective:       Patient ID: Jemima Aguillon is a 58 y.o. female.    Chief Complaint:  Menopause      History of Present Illness  HPI  Annual Exam-Postmenopausal   presents for annual exam. The patient is sexually active with her ; vaginal dryness.  OTC lubricant with relief. GYN screening history: last mammogram: approximate date 1/10/2023 and was normal. The patient has never taken hormone replacement therapy.  The patient wears seatbelts: yes. The patient participates in regular exercise: yes. Has the patient ever been transfused or tattooed?: yes. X1 tattoo.  The patient reports that there is not domestic violence in her life.    Hot flashes are tolerable -- every couple weeks.  C/o of weight gain, fatigue, chin hair.  Cycles regularly.  Monitoring her diet. Gained about 10lbs in the last year and a half.  Her clothes are not fitting as well as they were.  Now a size 2 from size 0.  Worse in the last 8 months.  Mood swings.    No bladder or bowel issues.  Voiding more frequently, but drinking more water.    GYN & OB History  No LMP recorded. Patient has had a hysterectomy.   Date of Last Pap: No result found    OB History    Para Term  AB Living   6 6 5 1   5   SAB IAB Ectopic Multiple Live Births           5      # Outcome Date GA Lbr Joe/2nd Weight Sex Delivery Anes PTL Lv   6 Term            5 Term         AZALEA   4 Term         AZALEA   3 Term         AZALEA   2 Term         AZALEA   1   28w0d   F CS-LTranv   AZALEA      Complications: Abruptio Placenta       Review of Systems  Review of Systems   Constitutional:  Positive for fatigue and unexpected weight change. Negative for activity change, appetite change, chills and fever.   HENT:  Negative for nasal congestion and mouth sores.    Respiratory:  Negative for cough, shortness of breath and wheezing.    Cardiovascular:  Negative for chest pain.   Gastrointestinal:  Negative for abdominal pain, constipation, diarrhea, nausea  and vomiting.   Endocrine: Negative for hair loss and hot flashes.   Genitourinary:  Positive for hot flashes and vaginal dryness. Negative for bladder incontinence, decreased libido, dyspareunia, dysuria, frequency, genital sores, pelvic pain, urgency, vaginal discharge, vaginal pain, urinary incontinence, postcoital bleeding, postmenopausal bleeding and vaginal odor.   Musculoskeletal:  Negative for back pain.   Integumentary:  Negative for breast mass, nipple discharge, breast skin changes and breast tenderness.   Neurological:  Negative for headaches.   Hematological:  Negative for adenopathy.   Psychiatric/Behavioral:  Negative for depression. The patient is not nervous/anxious.    All other systems reviewed and are negative.  Breast: Negative for breast self exam, lump, mass, mastodynia, nipple discharge, skin changes and tenderness          Objective:      Physical Exam:   Constitutional: She is oriented to person, place, and time. She appears well-developed and well-nourished. No distress.    HENT:   Head: Normocephalic and atraumatic.   Nose: Nose normal.    Eyes: Pupils are equal, round, and reactive to light. Conjunctivae and EOM are normal. Right eye exhibits no discharge. Left eye exhibits no discharge.     Cardiovascular:  Normal rate, regular rhythm and normal heart sounds.      Exam reveals no gallop, no friction rub, no clubbing, no cyanosis and no edema.       No murmur heard.   Pulmonary/Chest: Effort normal and breath sounds normal. No respiratory distress. She has no decreased breath sounds. She has no wheezes. She has no rhonchi. She has no rales. She exhibits no tenderness. Right breast exhibits no inverted nipple, no mass, no nipple discharge, no skin change, no tenderness, no bleeding and no swelling. Left breast exhibits no inverted nipple, no mass, no nipple discharge, no skin change, no tenderness, no bleeding and no swelling. Breasts are symmetrical.        Abdominal: Soft. Bowel sounds  are normal. She exhibits no distension. There is no abdominal tenderness. There is no rebound and no guarding. Hernia confirmed negative in the right inguinal area and confirmed negative in the left inguinal area.     Genitourinary:    Inguinal canal and vagina normal.   Rectum:      No external hemorrhoid.      Pelvic exam was performed with patient in the lithotomy position.   The external female genitalia was normal.   No external genitalia lesions identified,Genitalia hair distrobution normal .     Labial bartholins normal.There is no rash, tenderness, lesion or injury on the right labia. There is no rash, tenderness, lesion or injury on the left labia. There is an absent right adnexa and an absent left adnexa. No erythema, vaginal discharge, tenderness or bleeding in the vagina.    No foreign body in the vagina.      No signs of injury in the vagina.   Vagina was moist.Vaginal atrophy noted. Cervix is absent.Uterus is absent. Normal urethral meatus.Urethral Meatus exhibits: urethral lesionUrethra findings: no urethral mass, no tenderness, no urethral scarring and prolapsedBladder findings: no bladder distention and no bladder tenderness          Musculoskeletal: Normal range of motion and moves all extremeties.      Lymphadenopathy: No inguinal adenopathy noted on the right or left side.    Neurological: She is alert and oriented to person, place, and time.    Skin: Skin is warm and dry. No rash noted. She is not diaphoretic. No cyanosis or erythema. No pallor. Nails show no clubbing.    Psychiatric: She has a normal mood and affect. Her speech is normal and behavior is normal. Judgment and thought content normal.             Assessment:        1. Encounter for gynecological examination without abnormal finding    2. Menopausal and postmenopausal disorder    3. Fatigue, unspecified type    4. Weight gain               Plan:   Reviewed options for hot flushes, including lifestyle measures such as avoiding  caffeine, dressing in layers, avoiding hot foods/drinks, soy milk before bed, sleeping with a fan--reviewed use otc--amberen, estroven, helena iker, black cohosh, red clover, non estrogen--brisdelle, clonidine vs estrogen  Reviewed risks/use ERT    Labs today    Continue diet and exercise.    Ensure adequate calcium and vitamin D intake and daily weight bearing exercises.    Discussed oil based lubricants for dryness; RTC if not effective.    Recommend annual pelvic exams.  Reviewed recommendations for annual CBE.  Pap not indicated due to hx of nml pap and hx of luis  RTC 1 year or sooner prn.  To PCP for other health maintenance.      Encounter for gynecological examination without abnormal finding  -     Vitamin D; Future  -     Hemoglobin A1C; Future; Expected date: 02/02/2024  -     TESTOSTERONE; Future; Expected date: 02/02/2024  -     DHEA-Sulfate; Future; Expected date: 02/02/2024  -     Estradiol; Future; Expected date: 02/02/2024  -     Progesterone; Future; Expected date: 02/02/2024    Menopausal and postmenopausal disorder  -     Vitamin D; Future  -     Hemoglobin A1C; Future; Expected date: 02/02/2024  -     TESTOSTERONE; Future; Expected date: 02/02/2024  -     DHEA-Sulfate; Future; Expected date: 02/02/2024  -     Estradiol; Future; Expected date: 02/02/2024  -     Progesterone; Future; Expected date: 02/02/2024    Fatigue, unspecified type  -     Vitamin D; Future  -     Hemoglobin A1C; Future; Expected date: 02/02/2024  -     TESTOSTERONE; Future; Expected date: 02/02/2024  -     DHEA-Sulfate; Future; Expected date: 02/02/2024  -     Estradiol; Future; Expected date: 02/02/2024  -     Progesterone; Future; Expected date: 02/02/2024    Weight gain  -     Hemoglobin A1C; Future; Expected date: 02/02/2024  -     TESTOSTERONE; Future; Expected date: 02/02/2024  -     DHEA-Sulfate; Future; Expected date: 02/02/2024  -     Estradiol; Future; Expected date: 02/02/2024  -     Progesterone; Future;  Expected date: 02/02/2024

## 2024-02-03 LAB
25(OH)D3+25(OH)D2 SERPL-MCNC: 62 NG/ML (ref 30–96)
DHEA-S SERPL-MCNC: 107.1 UG/DL (ref 29.7–182.2)
ESTIMATED AVG GLUCOSE: 103 MG/DL (ref 68–131)
ESTRADIOL SERPL-MCNC: <10 PG/ML
HBA1C MFR BLD: 5.2 % (ref 4–5.6)
PROGEST SERPL-MCNC: 0.1 NG/ML
TESTOST SERPL-MCNC: 14 NG/DL (ref 5–73)

## 2024-02-13 ENCOUNTER — PATIENT MESSAGE (OUTPATIENT)
Dept: OBSTETRICS AND GYNECOLOGY | Facility: CLINIC | Age: 59
End: 2024-02-13
Payer: COMMERCIAL

## 2024-02-15 ENCOUNTER — OFFICE VISIT (OUTPATIENT)
Dept: OBSTETRICS AND GYNECOLOGY | Facility: CLINIC | Age: 59
End: 2024-02-15
Payer: COMMERCIAL

## 2024-02-15 DIAGNOSIS — N95.9 MENOPAUSAL AND POSTMENOPAUSAL DISORDER: Primary | ICD-10-CM

## 2024-02-15 PROCEDURE — 99214 OFFICE O/P EST MOD 30 MIN: CPT | Mod: 95,,, | Performed by: NURSE PRACTITIONER

## 2024-02-15 RX ORDER — ESTRADIOL 0.1 MG/G
CREAM VAGINAL
Qty: 42.5 G | Refills: 3 | Status: SHIPPED | OUTPATIENT
Start: 2024-02-15 | End: 2025-02-13

## 2024-02-15 RX ORDER — BUSPIRONE HYDROCHLORIDE 5 MG/1
5 TABLET ORAL 2 TIMES DAILY
Qty: 60 TABLET | Refills: 11 | Status: SHIPPED | OUTPATIENT
Start: 2024-02-15 | End: 2024-03-22 | Stop reason: SDUPTHER

## 2024-02-15 NOTE — PROGRESS NOTES
Subjective:       Patient ID: Jemima Aguillon is a 58 y.o. female.    Chief Complaint:  No chief complaint on file.      History of Present Illness  HPI  The patient location is: [place] at work  The chief complaint leading to consultation is: menopause    Visit type: audiovisual    Face to Face time with patient: 16 minutes of total time spent on the encounter, which includes face to face time and non-face to face time preparing to see the patient (eg, review of tests), Obtaining and/or reviewing separately obtained history, Documenting clinical information in the electronic or other health record, Independently interpreting results (not separately reported) and communicating results to the patient/family/caregiver, or Care coordination (not separately reported).         Each patient to whom he or she provides medical services by telemedicine is:  (1) informed of the relationship between the physician and patient and the respective role of any other health care provider with respect to management of the patient; and (2) notified that he or she may decline to receive medical services by telemedicine and may withdraw from such care at any time.    Notes:    present to discuss menopause  Had labs done  Very bad mood swings   Has had triggers recently that are worse  Insomnia intermittently  Does not like taking medications regularly  Interested is vaginal estrace    GYN & OB History  No LMP recorded. Patient has had a hysterectomy.   Date of Last Pap: No result found    OB History    Para Term  AB Living   6 6 5 1   5   SAB IAB Ectopic Multiple Live Births           5      # Outcome Date GA Lbr Joe/2nd Weight Sex Delivery Anes PTL Lv   6 Term            5 Term         AZALEA   4 Term         AZALEA   3 Term         AZALEA   2 Term         AZALEA   1   28w0d   F CS-LTranv   AZALEA      Complications: Abruptio Placenta       Review of Systems  Review of Systems   Constitutional:  Positive for  fatigue and unexpected weight change.   Genitourinary:  Positive for dyspareunia and vaginal dryness.   Psychiatric/Behavioral:  Negative for sleep disturbance.         +mood swings           Objective:      Physical Exam:   Constitutional: She is oriented to person, place, and time. She appears well-developed and well-nourished. No distress.    HENT:   Head: Normocephalic and atraumatic.    Eyes: Pupils are equal, round, and reactive to light. Conjunctivae and EOM are normal.      Pulmonary/Chest: Effort normal.                  Musculoskeletal: Normal range of motion.       Neurological: She is alert and oriented to person, place, and time.    Skin: She is not diaphoretic.    Psychiatric: She has a normal mood and affect. Her behavior is normal. Judgment and thought content normal.           Assessment:        1. Menopausal and postmenopausal disorder               Plan:   Reviewed labs and discussed in detail    Discussed symptoms of menopause    Yearly mammograms starting at 39 until age 74    Pt accepts trial of estrace cream  rx sent and instructed on use; nightly x2 weeks then twice a week.    Pt accepts trial of Rx sent for buspar with instructions   Continue annual well woman exam.    Menopausal and postmenopausal disorder  -     busPIRone (BUSPAR) 5 MG Tab; Take 1 tablet (5 mg total) by mouth 2 (two) times daily.  Dispense: 60 tablet; Refill: 11  -     estradioL (ESTRACE) 0.01 % (0.1 mg/gram) vaginal cream; Place 1 g vaginally once daily for 14 days, THEN 1 g twice a week.  Dispense: 42.5 g; Refill: 3

## 2024-02-23 ENCOUNTER — HOSPITAL ENCOUNTER (OUTPATIENT)
Dept: RADIOLOGY | Facility: HOSPITAL | Age: 59
Discharge: HOME | End: 2024-02-23
Attending: INTERNAL MEDICINE
Payer: COMMERCIAL

## 2024-02-23 DIAGNOSIS — Z12.31 ENCOUNTER FOR SCREENING MAMMOGRAM FOR HIGH-RISK PATIENT: ICD-10-CM

## 2024-02-23 PROCEDURE — 77067 SCR MAMMO BI INCL CAD: CPT | Mod: 26,,, | Performed by: RADIOLOGY

## 2024-02-23 PROCEDURE — 77063 BREAST TOMOSYNTHESIS BI: CPT | Mod: 26,,, | Performed by: RADIOLOGY

## 2024-02-23 PROCEDURE — 77067 SCR MAMMO BI INCL CAD: CPT | Mod: TC,PO

## 2024-02-24 NOTE — PROGRESS NOTES
Your mammogram is normal.  Repeat screening is recommended in one year.    Sincerely,    Gagan Amaya M.D.        If you would like to review your experience with Dr. Amaya or [facility], please follow the link below:    [email or web address]

## 2024-03-22 ENCOUNTER — OFFICE VISIT (OUTPATIENT)
Dept: OBSTETRICS AND GYNECOLOGY | Facility: CLINIC | Age: 59
End: 2024-03-22
Payer: COMMERCIAL

## 2024-03-22 VITALS
WEIGHT: 137.13 LBS | HEIGHT: 62 IN | DIASTOLIC BLOOD PRESSURE: 57 MMHG | BODY MASS INDEX: 25.23 KG/M2 | SYSTOLIC BLOOD PRESSURE: 122 MMHG

## 2024-03-22 DIAGNOSIS — N95.9 MENOPAUSAL AND POSTMENOPAUSAL DISORDER: Primary | ICD-10-CM

## 2024-03-22 PROCEDURE — 99999 PR PBB SHADOW E&M-EST. PATIENT-LVL III: CPT | Mod: PBBFAC,,, | Performed by: NURSE PRACTITIONER

## 2024-03-22 PROCEDURE — 99213 OFFICE O/P EST LOW 20 MIN: CPT | Mod: S$GLB,,, | Performed by: NURSE PRACTITIONER

## 2024-03-22 RX ORDER — BUSPIRONE HYDROCHLORIDE 5 MG/1
5 TABLET ORAL 3 TIMES DAILY
Qty: 90 TABLET | Refills: 11 | Status: SHIPPED | OUTPATIENT
Start: 2024-03-22 | End: 2025-03-22

## 2024-03-22 NOTE — PROGRESS NOTES
Subjective:       Patient ID: Jemima Aguillon is a 58 y.o. female.    Chief Complaint:  Follow-up (Med check)      History of Present Illness  Gynecologic Exam  The patient's pertinent negatives include no genital itching, genital lesions, genital odor, genital rash, missed menses or vaginal discharge. The problem has been unchanged. The patient is experiencing no pain. She is not pregnant. Pertinent negatives include no abdominal pain, anorexia, back pain, chills, constipation, diarrhea, discolored urine, dysuria, fever, flank pain, frequency, headaches, hematuria, nausea, painful intercourse, rash, urgency or vomiting. She has not been passing clots. She has not been passing tissue.      present for med check  Started luis  Feels good with it  Takes BID  Able to sleep at night   Using estrace cream; does not feel dry throughout the day  Has not had intercourse yet  Using twice weekly    GYN & OB History  No LMP recorded. Patient has had a hysterectomy.   Date of Last Pap: No result found    OB History    Para Term  AB Living   6 6 5 1   5   SAB IAB Ectopic Multiple Live Births           5      # Outcome Date GA Lbr Joe/2nd Weight Sex Delivery Anes PTL Lv   6 Term            5 Term         AZALEA   4 Term         AZALEA   3 Term         AZALEA   2 Term         AZALEA   1   28w0d   F CS-LTranv   AZALEA      Complications: Abruptio Placenta       Review of Systems  Review of Systems   Constitutional:  Negative for chills and fever.   Gastrointestinal:  Negative for abdominal pain, anorexia, constipation, diarrhea, nausea and vomiting.   Genitourinary:  Negative for dysuria, flank pain, frequency, hematuria, missed menses, urgency, vaginal discharge and vaginal dryness.   Musculoskeletal:  Negative for back pain.   Integumentary:  Negative for rash.   Neurological:  Negative for headaches.   Psychiatric/Behavioral:  The patient is not nervous/anxious.            Objective:      Physical Exam:    Constitutional: She is oriented to person, place, and time. She appears well-developed and well-nourished. No distress.    HENT:   Head: Normocephalic and atraumatic.    Eyes: Pupils are equal, round, and reactive to light. Conjunctivae and EOM are normal.      Pulmonary/Chest: Effort normal.                  Musculoskeletal: Normal range of motion and moves all extremeties.       Neurological: She is alert and oriented to person, place, and time.    Skin: Skin is warm and dry. No rash noted. She is not diaphoretic. No erythema. No pallor.    Psychiatric: She has a normal mood and affect. Her behavior is normal. Judgment and thought content normal.             Assessment:        1. Menopausal and postmenopausal disorder               Plan:   Increased to TID with instructions    Continue annual well woman exam.    Menopausal and postmenopausal disorder  -     busPIRone (BUSPAR) 5 MG Tab; Take 1 tablet (5 mg total) by mouth 3 (three) times daily.  Dispense: 90 tablet; Refill: 11

## 2024-04-26 ENCOUNTER — TELEPHONE (OUTPATIENT)
Dept: INTERNAL MEDICINE | Facility: CLINIC | Age: 59
End: 2024-04-26
Payer: COMMERCIAL

## 2024-08-20 ENCOUNTER — OFFICE VISIT (OUTPATIENT)
Dept: SPORTS MEDICINE | Facility: CLINIC | Age: 59
End: 2024-08-20
Payer: COMMERCIAL

## 2024-08-20 VITALS — HEIGHT: 62 IN | BODY MASS INDEX: 25.23 KG/M2 | WEIGHT: 137.13 LBS

## 2024-08-20 DIAGNOSIS — M25.851 FEMOROACETABULAR IMPINGEMENT OF RIGHT HIP: ICD-10-CM

## 2024-08-20 DIAGNOSIS — M25.551 PAIN OF RIGHT HIP: ICD-10-CM

## 2024-08-20 DIAGNOSIS — G57.01 PIRIFORMIS SYNDROME, RIGHT: ICD-10-CM

## 2024-08-20 DIAGNOSIS — R29.898 IMPAIRED FLEXIBILITY OF LOWER EXTREMITY: ICD-10-CM

## 2024-08-20 DIAGNOSIS — M67.951 TENDINOPATHY OF RIGHT GLUTEUS MEDIUS: Primary | ICD-10-CM

## 2024-08-20 PROCEDURE — 99213 OFFICE O/P EST LOW 20 MIN: CPT | Mod: S$GLB,,, | Performed by: PHYSICIAN ASSISTANT

## 2024-08-20 PROCEDURE — 99999 PR PBB SHADOW E&M-EST. PATIENT-LVL III: CPT | Mod: PBBFAC,,, | Performed by: PHYSICIAN ASSISTANT

## 2024-08-20 NOTE — PROGRESS NOTES
Orthopaedic Follow-Up Visit    Last Appointment:  12/14/2022  Diagnosis:  Piriformis syndrome of right side  Prior Procedure:  PT with Leidy at the [facility] 06/07/2022    Jemima Aguillon is a 59 y.o. female who is here for f/u evaluation of right hip pain. The patient was last seen here by me on 12/14/2022 at which point we decided to send her to formal physical therapy prior to considering further treatment options. The patient returns today reporting that the symptoms have returned, she reports that the pain feels different this time.  She does endorse intermittent groin pain as well as a popping sensation in her right hip. She is interested in proceeding with further treatment options.     To review her history,  Jemima Aguillon is a 56 y.o. female who presents with right hip pain and dysfunction. Onset of the symptoms was 2019. Inciting event: Patient was hiking and slippedd on a rock. She has had issues with tightness and discomfort since. Current symptoms include pain in the right hip and buttock, pain quality is intermittent aching and throbbing pain along with sharp, shooting pain that radiates down her right leg. Pain is aggravated by sitting too long. Evaluation to date: x-rays. Treatment to date: activity modification, NSAIDs, stretching.     Patient's medications, allergies, past medical, surgical, social and family histories were reviewed and updated as appropriate.    Review of Systems   All systems reviewed were negative.  Specifically, the patient denies fever, chills, weight loss, chest pain, shortness of breath, or dyspnea on exertion.      Past Medical History:   Diagnosis Date    Allergy        Objective:      Physical Exam  Patient is alert and oriented, no distress. Skin is intact. Neuro is normal with no focal motor or sensory findings.    Standing exam   stance: normal alignment, no significant leg-length discrepancy  gait: antalgic     Right Hip:  Inspection:  normal     Palpation tenderness: Posterior, glutes, piriformis     Range of motion: 100 deg Flexion                               5 deg IR                               30 deg ER                       ROM reduced compared to Left hip, which is within normal limits      Strength:         5/5 Flexion                          5/5 Abduction                          5/5 Adduction     Impingement Tests:     Positive TORSTEN                                      Positive FADIR     Other Tests:                Negative Log Roll  Negative Circumduction  Positive Piriformis                                     Negative Crow's                                            N/V Exam:       Tibial:                          Normal sensory (plantar foot)             Normal motor (FHL)                Sup Peroneal:             Normal sensory (dorsal foot)              Normal motor (Peroneals)                              Deep Peroneal:           Normal sensory (1st web space)                    Normal motor (EHL)                 Sural:                           Normal sensory (lateral foot)              Saphenous:                 Normal sensory (medial lower leg)              Normal pedal pulses, warm and well perfused with capillary refill < 2 sec         Neurovascular exam  - motor function grossly intact bilaterally to hip flexion, knee extension and flexion, ankle dorsiflexion and plantarflexion  - sensation intact to light touch bilaterally to femoral, tibial, tibial and peroneal distributions  - symmetrical pedal pulses    Imaging:   XR Results:  EXAMINATION:  XR HIP WITH PELVIS WHEN PERFORMED, 2 OR 3  VIEWS RIGHT     CLINICAL HISTORY:  Pain in right hip     TECHNIQUE:  AP view of the pelvis and frog leg lateral view of the right hip were performed.     COMPARISON:  None     FINDINGS:  Hip joints are symmetric in appearance.  No evidence of AVN.  No fracture or dislocation.  No significant joint space narrowing.  No soft tissue  abnormality.     Impression:     See above        Electronically signed by:Anjel Ochoa MD  Date:                                            06/07/2022  Time:                                           10:18      Physician Read: I agree with the above impression.    Assessment/Plan:   Assessment:  Jemima Aguillon is a 59 y.o. female with right gluteal tendinopathy, impingement of right hip, reduced flexibility on the right side    Plan:    Discussed diagnosis and treatment options with the patient today.  She has a 3 year history of intermittent right hip pain.  She is very active, cycles 120 miles a week and hikes frequently.  Her most recent flare-up started about 6 months ago, she is now endorsing groin pain and reduced range of motion that she was not experiencing at prior visits.  She has now tried considerable conservative treatment in the form of rest, activity modification, over-the-counter Tylenol (she is unable to take oral anti-inflammatories due to her recent allergy), 2 separate rounds of formal physical therapy, home exercises, manual therapy. Despite these interventions she still continues to have intermittent right hip pain that is affecting her activities of daily living  I recommend we move forward with a right hip MRI to evaluate the muscles surrounding her hip as well as evaluate for any intra-articular pathology  Discussed that we will more than likely get her set up in formal physical therapy to address her limited mobility on the right side, I would like to see what her MRIs say prior to getting her started in physical therapy  She has been therapy at the [facility] in the past, this location is no longer convenient for her.  We will get her set up at therapy near her work place.   [facility]  Follow-up with me virtually after MRI to review results        Elsa Cartagena PA-C  Sports Medicine Physician Assistant       Disclaimer: This note was prepared  using a voice recognition system and is likely to have sound alike errors within the text.

## 2024-08-26 ENCOUNTER — HOSPITAL ENCOUNTER (OUTPATIENT)
Dept: RADIOLOGY | Facility: HOSPITAL | Age: 59
Discharge: HOME | End: 2024-08-26
Attending: PHYSICIAN ASSISTANT
Payer: COMMERCIAL

## 2024-08-26 DIAGNOSIS — M25.851 FEMOROACETABULAR IMPINGEMENT OF RIGHT HIP: ICD-10-CM

## 2024-08-26 DIAGNOSIS — M67.951 TENDINOPATHY OF RIGHT GLUTEUS MEDIUS: ICD-10-CM

## 2024-08-26 DIAGNOSIS — M25.551 PAIN OF RIGHT HIP: ICD-10-CM

## 2024-08-26 PROCEDURE — 73721 MRI JNT OF LWR EXTRE W/O DYE: CPT | Mod: TC,PN,RT

## 2024-08-26 PROCEDURE — 73721 MRI JNT OF LWR EXTRE W/O DYE: CPT | Mod: 26,RT,, | Performed by: RADIOLOGY

## 2024-09-10 ENCOUNTER — OFFICE VISIT (OUTPATIENT)
Dept: SPORTS MEDICINE | Facility: CLINIC | Age: 59
End: 2024-09-10
Payer: COMMERCIAL

## 2024-09-10 ENCOUNTER — PATIENT MESSAGE (OUTPATIENT)
Dept: SPORTS MEDICINE | Facility: CLINIC | Age: 59
End: 2024-09-10
Payer: COMMERCIAL

## 2024-09-10 DIAGNOSIS — M16.11 ARTHRITIS OF RIGHT HIP: Primary | ICD-10-CM

## 2024-09-10 DIAGNOSIS — M67.951 TENDINOPATHY OF RIGHT GLUTEUS MEDIUS: ICD-10-CM

## 2024-09-10 PROCEDURE — 99211 OFF/OP EST MAY X REQ PHY/QHP: CPT | Mod: 95,,, | Performed by: PHYSICIAN ASSISTANT

## 2024-09-10 NOTE — PROGRESS NOTES
Telemedicine Visit  [place]  The chief complaint leading to consultation is: see HPI    Each patient to whom he or she provides medical services by telemedicine is:  (1) informed of the relationship between the physician and patient and the respective role of any other health care provider with respect to management of the patient; and (2) notified that he or she may decline to receive medical services by telemedicine and may withdraw from such care at any time.The patient location is: home     VISIT TYPE X   Virtual visit with synchronous audio and video  X   Telephone E/M service        Patient ID: Jemima Aguillon  YOB: 1965  MRN: [id number]    Chief Complaint: No chief complaint on file.      History of Present Illness: Jemima Aguillon is a 59 y.o. female here today for right hip MRI review.  She was last seen by me on 2024 at which time we elected to move forward with a right hip MRI due to her having chronic right hip pain for about 2 years. She is very physically active, cycles about 120 miles a week and frequently goes on hikes.    To review her history,  Jemima Aguillon is a 56 y.o. female who presents with right hip pain and dysfunction. Onset of the symptoms was . Inciting event: Patient was hiking and slippedd on a rock. She has had issues with tightness and discomfort since. Current symptoms include pain in the right hip and buttock, pain quality is intermittent aching and throbbing pain along with sharp, shooting pain that radiates down her right leg. Pain is aggravated by sitting too long. Evaluation to date: x-rays. Treatment to date: activity modification, NSAIDs, stretching.       Past Medical History:   Past Medical History:   Diagnosis Date    Allergy      Past Surgical History:   Procedure Laterality Date     SECTION      x3    HYSTERECTOMY  2010    single oophorectomy; endometriosis    OOPHORECTOMY       1 ovary removed    URETEROSCOPY       Family History   Problem Relation Name Age of Onset    Pancreatic cancer Father  78         at 80    Endometrial cancer Paternal Aunt      Breast cancer Neg Hx      Ovarian cancer Neg Hx      Colon cancer Neg Hx       Social History     Socioeconomic History    Marital status:     Number of children: 5   Occupational History    Occupation: Manager   Tobacco Use    Smoking status: Never    Smokeless tobacco: Never   Substance and Sexual Activity    Alcohol use: Not Currently     Alcohol/week: 2.0 standard drinks of alcohol     Types: 2 Shots of liquor per week    Drug use: Never    Sexual activity: Yes     Partners: Male     Birth control/protection: See Surgical Hx     Medication List with Changes/Refills   Current Medications    BUSPIRONE (BUSPAR) 5 MG TAB    Take 1 tablet (5 mg total) by mouth 3 (three) times daily.    EPINEPHRINE (EPIPEN 2-AGAPITO) 0.3 MG/0.3 ML ATIN    Inject 0.3 mLs (0.3 mg total) into the muscle once. for 1 dose    ESTRADIOL (ESTRACE) 0.01 % (0.1 MG/GRAM) VAGINAL CREAM    Place 1 g vaginally once daily for 14 days, THEN 1 g twice a week.    VALACYCLOVIR (VALTREX) 500 MG TABLET    Take 3 tablets (1,500 mg total) by mouth once. Take at earliest onset of fever blister for 1 dose     Review of patient's allergies indicates:   Allergen Reactions    Peanut Anaphylaxis, Anxiety, Diarrhea, Hives, Itching, Nausea And Vomiting, Palpitations, Shortness Of Breath and Swelling    Soy Anaphylaxis, Anxiety, Diarrhea, Hives, Itching, Nausea And Vomiting, Other (See Comments), Palpitations, Shortness Of Breath and Swelling    Corn containing products Other (See Comments)    Wheat containing prod      ROS    Physical Exam:   NEURO: Awake, alert, and oriented x 3.  PSYCH: Mood & affect are appropriate.  Ortho/SPM Exam  Limited Physical Exam due to Telemedicine Visit      Imaging:    MRI Hip Without Contrast Right  Narrative: EXAM: MRI HIP WITHOUT CONTRAST  RIGHT    CLINICAL HISTORY:  Right hip pain.    TECHNIQUE: Standard multiplanar pulse sequences without IV or intra-articular contrast.    COMPARISON: None.    FINDINGS:    Bones of the right hip are intact.  No fracture.  No suspicious osseous lesion.    Severe osteoarthritis with full-thickness chondral loss scattered throughout the joint, greatest at the central and anterior joint.  Severe reactive marrow edema and large peripheral osteophytes.  Degenerative tearing of the labrum noted.  Large effusion and synovitis.    Mild tendinosis of the right gluteus minimus tendon at the tendon.  Right gluteus medius intact.  Focal calcification or hydroxyapatite deposition abuts the posterior aspect of the subtrochanteric proximal femur, likely representing calcific tendinitis of the inserting gluteus fariba tendon.  Mild tendinosis right common hamstring tendon.  Right iliopsoas tendon normal.    Intact bony pelvis with normal marrow.  Contralateral hip with focal full-thickness chondral loss of the far anterior joint.  Rectum and edema noted.  Intrapelvic contents are unremarkable.  Impression: 1.     Severe osteoarthritis of the right hip.    2.    Mild tendinosis right gluteal tendons.  Evidence of calcific tendinitis of the distal gluteus and fariba tendon.    Finalized on: 8/26/2024 11:14 AM By:  Epifanio Rod MD  [facility]# [id number]      2024-08-26 11:17:00.389    [facility]    No new radiographic images obtained at today's visit. Previous images reviewed at this time.    Relevant imaging results reviewed and interpreted by me, discussed with the patient and / or family today.      Other Tests:     No other tests performed today.    There are no Patient Instructions on file for this visit.  Provider Note/Medical Decision Making:     Reviewed right hip MRI with the patient today. Her MRI revealed she has severe osteoarthritis of the right hip. She also has tendinosis of the right glute muscles with calcific tendinitis at  the glutes insertion.  At last visit she was endorsing right groin pain which correlates with her hip arthritis  She has tried conservative treatment for her hip in the form of rest, activity modification, over-the-counter Tylenol (she is unable to take oral anti-inflammatories due to an allergy), formal physical therapy, home exercises, manual therapy. Despite these interventions she still continues to have intermittent right hip pain that affect her ADLs as well as her ability to cycle  I recommend we transition care to one of our primary care sports medicine physicians, Dr. Schmid, for consideration of a right intra-articular hip injection to target her hip arthritis. Discussed with her if her hip pain switches to her gluteal area, they can also address her calcific tendinitis in her glute tendons, patient voiced understanding  I would also like to place a referral to formal physical therapy with Leidy Negron, she was seen her twice in the past with improvement of symptoms. I am hopeful this will help address some of her range of motion deficits in her hip  Internal Physical therapy referral placed to the [facility] with Leidy Negron  Follow up with Dr. Schmid for right hip intra-articular injection and to establish long term care for right hip pain      Total time spent with patient: see X marques on chart below.   More than half of the time was spent counseling or coordinating care including prognosis, differential diagnosis, risks and benefits of treatment, instructions, compliance risk reductions     VIDEO EST MINUTES X Min   66864 5 X 7   93731 10     74367 15     05339 25     91821 40     VIDEO NEW      40631 10     20173 20     79165 30     90550 45     48746 60     PHONE      13612 5-10     23919 11-20     00666 21-30           Elsa Cartagena PA-C  Sports Medicine Physician Assistant       Disclaimer: This note was prepared using a voice recognition system and is likely to have sound alike errors within the  text.

## 2024-09-19 ENCOUNTER — PROCEDURE VISIT (OUTPATIENT)
Dept: SPORTS MEDICINE | Facility: CLINIC | Age: 59
End: 2024-09-19
Payer: COMMERCIAL

## 2024-09-19 VITALS — BODY MASS INDEX: 25.23 KG/M2 | WEIGHT: 137.13 LBS | HEIGHT: 62 IN

## 2024-09-19 DIAGNOSIS — M25.551 PAIN OF RIGHT HIP: ICD-10-CM

## 2024-09-19 DIAGNOSIS — M25.851 FEMOROACETABULAR IMPINGEMENT OF RIGHT HIP: ICD-10-CM

## 2024-09-19 DIAGNOSIS — M16.11 ARTHRITIS OF RIGHT HIP: Primary | ICD-10-CM

## 2024-09-19 RX ORDER — TRIAMCINOLONE ACETONIDE 40 MG/ML
40 INJECTION, SUSPENSION INTRA-ARTICULAR; INTRAMUSCULAR
Status: DISCONTINUED | OUTPATIENT
Start: 2024-09-19 | End: 2024-09-19 | Stop reason: HOSPADM

## 2024-09-19 RX ADMIN — TRIAMCINOLONE ACETONIDE 40 MG: 40 INJECTION, SUSPENSION INTRA-ARTICULAR; INTRAMUSCULAR at 10:09

## 2024-09-19 NOTE — PATIENT INSTRUCTIONS
Assessment:  Jemima Aguillon is a 59 y.o. female with a chief complaint of Injury of the Right Hip    Encounter Diagnoses   Name Primary?    Arthritis of right hip Yes    Femoroacetabular impingement of right hip     Pain of right hip       Plan:  Right hip diagnostic corticosteroid injection today.    Proper protocols after the injection included: no submerging pools, baths tubs, or hot tubs for 24 hr.  Showering is okay today.  Side effects of the corticosteroid injection can include elevated blood glucose levels and blood pressures, so if you are taking medications for these, please monitor closely, and contact your PCP if any issues.  Red flag symptoms include fever, chills, nausea, vomiting, red, warm, tender joint at the area of injection.  If you are noticing these symptoms, they may be indicative of an infection, and please seek medical care immediately, either by calling our clinic or going to the emergency room.  We will transfer care from Elsa Cartagena    Follow-up:  4 weeks or sooner if there are any problems between now and then.    Thank you for choosing [facility] and Dr. Dennis Schmid for your orthopedic & sports medicine care. It is our goal to provide you with exceptional care that will help keep you healthy, active, and get you back in the game.    Please do not hesitate to reach out to us via email, phone, or MyChart with any questions, concerns, or feedback.    If you are experiencing pain/discomfort ,or have questions after 5pm and would like to be connected to the [facility] on-call team, please call this number and specify which Sports Medicine provider is treating you: [phone]

## 2024-09-19 NOTE — PROCEDURES
Large Joint Aspiration/Injection: R hip joint    Date/Time: 9/19/2024 10:00 AM    Performed by: Dennis Schmid MD  Authorized by: Dennis Schmid MD    Consent Done?:  Yes (Verbal)  Indications:  Pain, diagnostic evaluation and arthritis  Site marked: the procedure site was marked    Timeout: prior to procedure the correct patient, procedure, and site was verified      Local anesthesia used?: Yes    Local anesthetic:  Bupivacaine 0.5% without epinephrine, lidocaine 1% without epinephrine and topical anesthetic  Anesthetic total (ml):  4      Details:  Needle Size:  22 G  Ultrasonic Guidance for needle placement?: Yes    Images are saved and documented.  Approach:  Anterior  Location:  Hip  Site:  R hip joint  Medications:  40 mg triamcinolone acetonide 40 mg/mL  Patient tolerance:  Patient tolerated the procedure well with no immediate complications     Ultrasound guidance was used for needle localization. Images were saved and stored for documentation. The appropriate structures were visualized. Dynamic visualization of the needle was continuous throughout the procedures and maintained good position.     We discussed the proper protocols after the injection such as no submerging pools, baths tubs, or hot tubs for 24 hr.  Showering is okay today.  We also discussed that blood sugars can be elevated after an injection and asked patient to properly check their sugars over the next few days and contact their PCP if there are any concerns.  We discussed red flags such as fevers, chills, red, warm, tender joint at the area of injection to please seek medical care immediately.

## 2024-09-26 ENCOUNTER — CLINICAL SUPPORT (OUTPATIENT)
Dept: REHABILITATION | Facility: HOSPITAL | Age: 59
End: 2024-09-26
Payer: COMMERCIAL

## 2024-09-26 DIAGNOSIS — M16.11 ARTHRITIS OF RIGHT HIP: ICD-10-CM

## 2024-09-26 DIAGNOSIS — M67.951 TENDINOPATHY OF RIGHT GLUTEUS MEDIUS: ICD-10-CM

## 2024-09-26 PROBLEM — R29.898 IMPAIRED FLEXIBILITY OF LOWER EXTREMITY: Status: RESOLVED | Noted: 2022-06-17 | Resolved: 2024-09-26

## 2024-09-26 PROBLEM — R29.898 DECREASED STRENGTH OF LOWER EXTREMITY: Status: RESOLVED | Noted: 2022-06-17 | Resolved: 2024-09-26

## 2024-09-26 PROCEDURE — 97110 THERAPEUTIC EXERCISES: CPT

## 2024-09-26 PROCEDURE — 97161 PT EVAL LOW COMPLEX 20 MIN: CPT

## 2024-09-26 PROCEDURE — 97140 MANUAL THERAPY 1/> REGIONS: CPT

## 2024-09-26 NOTE — PATIENT INSTRUCTIONS
LE - MiniBand series (4)  [[id number]]    LOOPED ELASTIC BAND HIP EXTENSION -  Repeat 2 Repetitions, Hold 2 Minutes, Perform 2 Times a Week    LOOPED ELASTIC BAND HIP ABDUCTION -  Repeat 2 Repetitions, Hold 2 Minutes, Perform 2 Times a Week    Squat Burners -  Repeat 2 Repetitions, Hold 2 Minutes, Perform 2 Times a Week

## 2024-09-26 NOTE — PLAN OF CARE
[facility]   Physical Therapy Initial Evaluation     Name: Jemima Romero [facility] Number: [id number]    Therapy Diagnosis:   Encounter Diagnoses   Name Primary?    Arthritis of right hip     Tendinopathy of right gluteus medius      Physician: Elsa Cartagena PA-C     Physician Orders: PT Eval and Treat  Medical Diagnosis from Referral: Greater trochanteric bursitis of right hip [M70.61]  Evaluation Date: 2024  Authorization Period Expiration: 23  Plan of Care Expiration: 24   Progress Update: 10/26/24   Visit # / Visits authorized:      FOTO: Visit #1 - Scored : 1 / 3       PRECAUTIONS: Standard Precautions     MD Visit: 3 weeks.    Time In: 1305  Time Out: 1355  Total Appointment Time (timed & untimed codes): 50 minutes    SUBJECTIVE     Date of onset: 22 - right hip    History of current condition - Jemima is a 59 y.o. female who returns to Physical Therapy for intervention and progression of exercises following an injection from Dr. Schmid last week.  She notices the hip pain the most with daily activities, but relates them most to cycling and lack of strength.    [place]il: Cycling  [place]: [place]  for application trail    Sitting tolerance: 2 hours  Standing: variable, cannot standing with poor posture, 60 minute tolerance.   Walkin/2 mile at her worst.   Biking: uphill climbing- posterior knee and ischial tuberosity with 40+ miles.     Injection     Left hip flexor is starting to flare up due to compensation.    BRADEN: overuse, repetitive friction  Falls: none  Physician Instructions (per patient): n/a  Other concerns: nothing    Imaging: X-RAY: reviewed    Prior Therapy: N/A  Social History: Pt lives with their spouse  Living Environment: house  ADLs unable to complete: n/a  Gym/Home Equipment: [employer]- [place]- lifting at the gym (back squats and front squats), deadlifts, leg press  Occupation: Pt is  at [employer]  able to do all activities at [employer].   Prior Level of Function: Independent with all ADLs  Current Level of Function: 30% of PLOF  - cycling  - backpacker.     Pain:  Current 5 /10, worst 10 /10, best 0 /10   Location: Right Hip   Description: Aching, Dull and Shooting  Aggravating Factors: walking (stiff), the day after leg day. Split squats- all feels like strength. Single leg standing on right first thing in the morning, sitting prolonged.  Easing Factors: stretching, tylenol, injection    Pts goals: Pt reported goals are to get back to cycling and backpacking as well as all other recreational activities without pain or discomfort.     _______________________________________________________    Medical History:   Past Medical History:   Diagnosis Date    Allergy        Surgical History:   Jemima Aguillon  has a past surgical history that includes Ureteroscopy;  section; Oophorectomy (); and Hysterectomy ().    Medications:   Jemima has a current medication list which includes the following prescription(s): buspirone, epinephrine, estradiol, and valacyclovir.    Allergies:   Review of patient's allergies indicates:   Allergen Reactions    Peanut Anaphylaxis, Anxiety, Diarrhea, Hives, Itching, Nausea And Vomiting, Palpitations, Shortness Of Breath and Swelling    Soy Anaphylaxis, Anxiety, Diarrhea, Hives, Itching, Nausea And Vomiting, Other (See Comments), Palpitations, Shortness Of Breath and Swelling    Corn containing products Other (See Comments)    Wheat containing prod         OBJECTIVE   (x = not tested due to pain and/or inability to obtain test position)    RANGE OF MOTION:    Hip AROM/PROM Right   Left   Goal   Hip Flexion (120) 145 150 115   Hip IR (45) 32 38 40   Hip ER (45) 35 40 40       Lower Extremity  Strength RIGHT   Goal   Hip Flexion  []1  []2  []3  [x]4  []5                [x]+ []- 5/5 B    Hip Extension  []1  []2  []3  [x]4  []5                [x]+ []- 5/5 B   Hip  Abduction  []1  []2  []3  [x]4  []5                []+ [x]- 5/5 B   Hip Internal rotaiton  []1  []2  []3  [x]4  []5                [x]+ []- 5/5 B   Hip External rotation  []1  []2  []3  [x]4  []5                [x]+ []- 5/5 B   Knee Flexion []1  []2  []3  [x]4  []5                [x]+ []- 5/5 B   Knee Extension []1  []2  []3  [x]4  []5                [x]+ []- 5/5 B   Ankle Dorsiflexion []1  []2  []3  [x]4  []5                [x]+ []- 5/5 B   Ankle Plantarflexion []1  []2  []3  [x]4  []5                [x]+ []- 5/5 B     Lower Extremity  Strength LEFT   Goal   Hip Flexion  []1  []2  []3  [x]4  [x]5                [x]+ []- 5/5 B    Hip Extension  []1  []2  []3  [x]4  []5                [x]+ []- 5/5 B   Hip Abduction  []1  []2  [x]3  []4  []5                [x]+ []- 5/5 B   Hip Internal rotaiton  []1  []2  []3  [x]4  []5                [x]+ []- 5/5 B   Hip External rotation  []1  []2  []3  [x]4  []5                [x]+ []- 5/5 B   Knee Flexion []1  []2  []3  [x]4  []5                [x]+ []- 5/5 B   Knee Extension []1  []2  []3  [x]4  []5                [x]+ []- 5/5 B   Ankle Dorsiflexion []1  []2  []3  [x]4  []5                [x]+ []- 5/5 B   Ankle Plantarflexion []1  []2  []3  [x]4  []5                [x]+ []- 5/5 B     MUSCLE LENGTH:     Muscle Tested  Right   Left    Goal   Hip Flexors  decreased decreased Normal B   Quadriceps normal normal Normal B   Hamstrings  normal normal Normal B   Piriformis  decreased   12 inches  decreased   4 inches limited Normal B     Additional Objective Tests and Meausres: Functional Tests-   Movement Analysis:  Functional Test  Outcome       Single Leg Step Down [x]Functional  [x]Dysfunctional: left only  []Painful  []Non-Painful       Step Down Test: Left (46) Right (36)      FUNCTION:     CMS Impairment/Limitation/Restriction for FOTO Hip Survey    Therapist reviewed FOTO scores for Jemima Aguillon on 9/26/2024.   FOTO documents entered into EPIC - see Media  "section.    Limitation Score:26%         TREATMENT     Total Treatment time separate from Evaluation: (15) minutes     Jemima received the treatments listed below:      CPT Intervention  Right Hip Duration / Intensity  9/26/24   TE Chondral mobility Rec- 5 min L2   MT Soft Tissue:   Joint Mobility:   Mobilization with  movement:  Right Hip - inferior and lateral glides, Grade IV   TE Stretching:  Hip Flexor Trevor Test- 30" holds  Piriformis Stretch - figure 4   TE  Mini-Band Series Hip Extension, Abduction, Squats           TA  Function Tests Step Down Test     CPT Codes available for Billing:   (15) minutes of Manual therapy (MT) to improve pain and ROM.  (15) minutes of Therapeutic Exercise (TE) to develop strength, endurance, range of motion, and flexibility.  (00) minutes of Neuromuscular Re-Education (NMR)  to improve: Balance, Coordination, Kinesthetic, Sense, Proprioception, and Posture.  (00) minutes of Therapeutic Activities (TA) to improve functional performance.  Unattended Electrical Stimulation (ES) for muscle performance or pain modulation.  Vasopneumatic Device Therapy () for management of swelling/edema. (34742)  BFR: Blood flow restriction applied during exercise  NP or (-): Not Performed    See EMR under MEDIA for written consent provided for Dry Needling- DATE   Palpation Assessment to determine the necessity for Functional Dry Needling      PATIENT EDUCATION AND HOME EXERCISES     Education/Self-Care provided:  (included in treatment) minutes   Patient educated on the impairments noted above and the effects of physical therapy intervention to improve overall condition and QOL.   Patient was educated on all the above exercise prior/during/after for proper posture, positioning, and execution for safe performance with home exercise program.     Written Home Exercises Provided: yes. Prefers: Printed Copies  Exercises were reviewed and Jemima was able to demonstrate them prior to the end of the " session.  Jemima demonstrated good understanding of the education provided. See EMR under Patient Instructions for exercises provided during therapy sessions.    ASSESSMENT     Jemima is a 59 y.o. female referred to outpatient physical therapy with a medical diagnosis of   Encounter Diagnoses   Name Primary?    Arthritis of right hip     Tendinopathy of right gluteus medius    Signs and symptoms are consistent with the above diagnosis, with physical exam findings that support decreased range of motion, decreased extremity strength, joint hypomobility, increased joint and soft tissue edema (bilateral lower extremity), and impaired functional mobility. The above impairments will be addressed through manual therapy techniques, therapeutic exercises, functional training, and modalities as necessary. Patient was treated and educated on exercises for home program, progression of therapy, and benefits of therapy to achieve full functional mobility.       Pt prognosis is Excellent.   Pt will benefit from skilled outpatient Physical Therapy to address the deficits stated above and in the chart below, provide pt/family education, and to maximize pt's level of independence.     Plan of care discussed with patient: Yes  Pt's spiritual, cultural and educational needs considered and patient is agreeable to the plan of care and goals as stated below:     Anticipated Barriers for therapy: none    Medical Necessity is demonstrated by the following:   History  Co-morbidities and personal factors that may impact the plan of care Co-morbidities:   Past Medical History:   Diagnosis Date    Allergy        Personal Factors:   social background  lifestyle     low   Examination  Body Structures and Functions, activity limitations and participation restrictions that may impact the plan of care Body Regions:   lower extremities    Body Systems:    gross symmetry  ROM  strength  gross coordinated movement  balance  gait    Participation  "Restrictions:   See above in "Current Level of Function"     Activity limitations:   Learning and applying knowledge  no deficits    General Tasks and Commands  no deficits    Communication  no deficits    Mobility  lifting and carrying objects  walking    Self care  dressing    Domestic Life  doing house work (cleaning house, washing dishes, laundry)  assisting others    Interactions/Relationships  no deficits    Life Areas  no deficits    Community and Social Life  community life  recreation and leisure         high   Clinical Presentation stable and uncomplicated low   Decision Making/ Complexity Score: low       GOALS:  SHORT TERM GOALS: 4 weeks, (9/26/2024) Progress   Recent signs and systems trend is improving in order to progress towards LTG's.    Patient will be independent with HEP in order to further progress and return to maximal function.    Pain rating at Worst: 5/10 in order to progress towards increased independence with activity.    Patient will be able to correct postural deviations in sitting and standing, to decrease pain and promote postural awareness for injury prevention.       LONG TERM GOALS: Discharge, (11/26/24) Progress   Patient will return to normal ADL, recreational, and work related activities with less pain and limitation.     Patient will improve AROM to stated goals in order to return to maximal functional potential.     Patient will improve Strength to stated goals of appropriate musculature in order to improve functional independence.     Pain Rating at Best: 1/10 to improve Quality of Life.     Patient will meet predicted functional outcome (FOTO) score: 23% to increase self-worth & perceived functional ability.    Patient will have met/partially met personal goal of: getting back to all recreational activities without discomfort.           PLAN   Plan of care Certification: 9/26/2024 to  12/26/2024    Outpatient Physical Therapy 1-2 times weekly for 8 weeks to include any " combination of the following interventions: virtual visits, dry needling, modalities, electrical stimulation (IFC, Pre-Mod, Attended with Functional Dry Needling), Gait Training, Manual Therapy, Moist Heat/ Ice, Neuromuscular Re-ed, Patient Education, Self Care, Therapeutic Exercise, Functional Training and Therapeutic Activites     Thank you for this referral.    Leidy Negron, PT      I CERTIFY THE NEED FOR THESE SERVICES FURNISHED UNDER THIS PLAN OF TREATMENT AND WHILE UNDER MY CARE   Physician's comments:     Physician's Signature: ___________________________________________________

## 2024-10-03 ENCOUNTER — CLINICAL SUPPORT (OUTPATIENT)
Dept: REHABILITATION | Facility: HOSPITAL | Age: 59
End: 2024-10-03
Payer: COMMERCIAL

## 2024-10-03 DIAGNOSIS — M25.661 DECREASED RANGE OF MOTION OF RIGHT KNEE: Primary | ICD-10-CM

## 2024-10-03 PROBLEM — Z74.09 DECREASED STRENGTH, ENDURANCE, AND MOBILITY: Status: ACTIVE | Noted: 2022-06-17

## 2024-10-03 PROBLEM — R53.1 DECREASED STRENGTH, ENDURANCE, AND MOBILITY: Status: ACTIVE | Noted: 2022-06-17

## 2024-10-03 PROBLEM — R68.89 DECREASED STRENGTH, ENDURANCE, AND MOBILITY: Status: ACTIVE | Noted: 2022-06-17

## 2024-10-03 PROCEDURE — 97110 THERAPEUTIC EXERCISES: CPT | Performed by: PHYSICAL THERAPIST

## 2024-10-03 NOTE — PROGRESS NOTES
"[facility]   Physical Therapy Treatment Note        Name: Jemima Romero [facility] Number: [id number]    Therapy Diagnosis:   Encounter Diagnosis   Name Primary?    Decreased range of motion of right knee Yes     Physician: Elsa Cartagena PA-C    Visit Date: 10/3/2024    Physician Orders: PT Eval and Treat  Medical Diagnosis from Referral: Greater trochanteric bursitis of right hip [M70.61]  Evaluation Date: 9/26/2024  Authorization Period Expiration: 6/7/23  Plan of Care Expiration: 12/26/24      Progress Update: 10/26/24     Visit # / Visits authorized: 2 / 25 (+eval)                       FOTO: Scored : 1 / 3 (9/26/24)       PRECAUTIONS: Standard Precautions      MD Visit: 3 weeks.    PTA Visit #: 0/5     Time In: 1305  Time Out: 1400  Total Billable Time: 55 minutes    SUBJECTIVE     Pt reports:  Feels slightly better.  Made some changes to her clip placement on her shoes and that has seems to help   Compliance with Hep: Daily  Response to previous treatment: no adverse reactions to treatment/updated HEP  Functional change: Better    Pain: 1/10   Worst: 5/10  Location: Right anterior hip      OBJECTIVE     Objective Measures updated at progress report unless specified otherwise.  RANGE OF MOTION:     Hip AROM/PROM Right    Left    Goal   Hip Flexion (120) 145 150 115   Hip IR (45) 32 38 40   Hip ER (45) 35 40 40        Treatment     Gym/Equipment Access: [employer] - [place]  Time to Complete Exercises: increased for cueing and education    Jemima received the treatments listed below:      CPT Intervention  Right Hip Duration / Intensity  10/3/2024   TE Chondral mobility Rec- 5 min L2   TE Treadmill     MT Joint Mobility Right Hip - inferior and lateral glides, Grade IV   TE Mobility Internal rotation - wipers- 5'- prone   TE Stretching:  Hip Flexor Trevor Test- 30" holds with quad strap overload (3' total)  Piriformis Stretch - figure 4- supine 30" x5   TE Dynamic " Mobility 2X25 feet - hugs, sweeps, kicks, grabs  Scorpion Twist - x10 each direction.   TE Hip Mobility     TE  Mini-Band Series Green T-band- 2' each  Hip Extension  Abduction-  Squats - with 10s hold every 10 reps   TE Leg Press DL:#155 4x8   TE Knee Extension DL: #55  1x8   #65 3x8   TE Hamstring Curl DL: #75 4x8   PLAN         CPT Codes available for Billing:   (5) minutes of Manual therapy (MT) to improve pain and ROM.  (50) minutes of Therapeutic Exercise (TE) to develop strength, endurance, range of motion, and flexibility.  (0) minutes of Neuromuscular Re-Education (NMR)  to improve: Balance, Coordination, Kinesthetic, Sense, Proprioception, and Posture.  (0) minutes of Therapeutic Activities (TA) to improve functional performance.  (0) minutes of Gait Training (GT) to improve functional gait mechanics.  (0) minutes of Self- Care and Education  Unattended Electrical Stimulation (ES) for muscle performance or pain modulation.  Vasopneumatic Device Therapy () for management of swelling/edema. (55563)  BFR: Blood flow restriction applied during exercise  NP or (-): Not Performed    See EMR under MEDIA for written consent provided for Dry Needling- DATE   Palpation Assessment to determine the necessity for Functional Dry Needling     PATIENT EDUCATION AND HOME EXERCISES      Education/Self-Care provided:  (included in treatment unless specified above) minutes   Patient educated on the impairments noted above and the effects of physical therapy intervention to improve overall condition and Quality of Life  Patient was educated on all the above exercise prior/during/after for proper posture, positioning, and execution for safe performance with home exercise program.      Written Home Exercises Provided: yes. Prefers: [x] Printed [x] Electronic  Exercises were reviewed and Rylee was able to demonstrate them prior to the end of the session.  Rylee demonstrated good understanding of the education provided. See EMR  under Patient Instructions for exercises provided during therapy sessions.    ASSESSMENT     Jemima Aguillon tolerated Physical Therapy session well with minimal  complaints of pain or discomfort.  Objective findings are improving with pain, range of motion, strength.  Jemima Aguillon continues to have difficulty with hip flexion and internal rotation movements, such as lunges and cycling with a lower seat.  Therapy exercises were reviewed by revisiting exercises given from previous home exercise program while adding focus to hip and lower extremity strength and endurance work.  Handouts were not issued during today's visit. Jemima demonstrated good understanding of new exercises and will continue to progress at home until next follow-up.      Jemima Is progressing well towards her goals.   Pt prognosis is Good.     Pt will continue to benefit from skilled outpatient physical therapy to address the deficits listed in the problem list box on initial evaluation, provide pt/family education and to maximize pt's level of independence in the home and community environment.     Pt's spiritual, cultural and educational needs considered and pt agreeable to plan of care and goals.    Anticipated Barriers for therapy: none     GOALS:  SHORT TERM GOALS: 4 weeks, (9/26/2024) Progress   Recent signs and systems trend is improving in order to progress towards LTG's.     Patient will be independent with HEP in order to further progress and return to maximal function.     Pain rating at Worst: 5/10 in order to progress towards increased independence with activity.     Patient will be able to correct postural deviations in sitting and standing, to decrease pain and promote postural awareness for injury prevention.         LONG TERM GOALS: Discharge, (11/26/24) Progress   Patient will return to normal ADL, recreational, and work related activities with less pain and limitation.      Patient will improve AROM to  stated goals in order to return to maximal functional potential.      Patient will improve Strength to stated goals of appropriate musculature in order to improve functional independence.      Pain Rating at Best: 1/10 to improve Quality of Life.      Patient will meet predicted functional outcome (FOTO) score: 23% to increase self-worth & perceived functional ability.     Patient will have met/partially met personal goal of: getting back to all recreational activities without discomfort.             PLAN   Plan of care Certification: 9/26/2024 to  12/26/2024    Continue Plan of Care (POC) and progress per patient tolerance. See Treatment section for exercise progression.    Leidy Negron, PT    Disclaimer: This note was prepared using a voice recognition system and is likely to have sound alike errors within the text.

## 2024-10-10 ENCOUNTER — CLINICAL SUPPORT (OUTPATIENT)
Dept: REHABILITATION | Facility: HOSPITAL | Age: 59
End: 2024-10-10
Payer: COMMERCIAL

## 2024-10-10 DIAGNOSIS — M25.661 DECREASED RANGE OF MOTION OF RIGHT KNEE: ICD-10-CM

## 2024-10-10 DIAGNOSIS — Z74.09 DECREASED STRENGTH, ENDURANCE, AND MOBILITY: Primary | ICD-10-CM

## 2024-10-10 DIAGNOSIS — R53.1 DECREASED STRENGTH, ENDURANCE, AND MOBILITY: Primary | ICD-10-CM

## 2024-10-10 DIAGNOSIS — R68.89 DECREASED STRENGTH, ENDURANCE, AND MOBILITY: Primary | ICD-10-CM

## 2024-10-10 PROCEDURE — 97110 THERAPEUTIC EXERCISES: CPT | Performed by: PHYSICAL THERAPIST

## 2024-10-10 PROCEDURE — 97530 THERAPEUTIC ACTIVITIES: CPT | Performed by: PHYSICAL THERAPIST

## 2024-10-12 NOTE — PROGRESS NOTES
"[facility]   Physical Therapy Treatment Note        Name: Jemima Romero [facility] Number: [id number]    Therapy Diagnosis:   Encounter Diagnoses   Name Primary?    Decreased strength, endurance, and mobility Yes    Decreased range of motion of right knee      Physician: Elsa Cartagena PA-C    Visit Date: 10/10/2024    Physician Orders: PT Eval and Treat  Medical Diagnosis from Referral: Greater trochanteric bursitis of right hip [M70.61]  Evaluation Date: 9/26/2024  Authorization Period Expiration: 6/7/23  Plan of Care Expiration: 12/26/24      Progress Update: 10/26/24     Visit # / Visits authorized: 2 / 25 (+eval)                       FOTO: Scored : 1 / 3 (9/26/24)       PRECAUTIONS: Standard Precautions      MD Visit: 3 weeks.    PTA Visit #: 0/5     Time In: 1305  Time Out: 1400  Total Billable Time: 55 minutes    SUBJECTIVE     Pt reports:  Feels slightly better.  Made some changes to her clip placement on her shoes and that has seems to help   Compliance with Hep: Daily  Response to previous treatment: no adverse reactions to treatment/updated HEP  Functional change: Better    Pain: 1/10   Worst: 5/10  Location: Right anterior hip      OBJECTIVE     Objective Measures updated at progress report unless specified otherwise.  RANGE OF MOTION:     Hip AROM/PROM Right    Left    Goal   Hip Flexion (120) 145 150 115   Hip IR (45) 32 38 40   Hip ER (45) 35 40 40        Treatment     Gym/Equipment Access: [employer] - [place]  Time to Complete Exercises: increased for cueing and education    Jemima received the treatments listed below:      CPT Intervention  Right Hip Duration / Intensity  10/10/2024   TE Chondral mobility Rec- 5 min L2   TE Treadmill 5% x8 minutes   MT Joint Mobility     TE Mobility     TE Stretching:  Hip Flexor Trevor Test- 30" holds with quad strap overload (3' total)  Piriformis Stretch - figure 4- supine 30" x5   TE Dynamic Mobility 2X25 feet - " hugs, sweeps, kicks, grabs  Scorpion Twist - x10 each direction.   TE Hip Mobility Lung + rotational twist. (very painful right)   TE  Mini-Band Series HEP   TE Leg Press DL:#155 4x8  SL: #85 2x8   TE Knee Extension DL: #65 4x8   TE Hamstring Curl DL: #75 4x8   TA SS#1 3 rounds  Sled Push- #45 - x2 lap  Wall Sit - 45 sec  Plank 30 sec   TA  Function Tests     PLAN         CPT Codes available for Billing:   (0) minutes of Manual therapy (MT) to improve pain and ROM.  (40) minutes of Therapeutic Exercise (TE) to develop strength, endurance, range of motion, and flexibility.  (0) minutes of Neuromuscular Re-Education (NMR)  to improve: Balance, Coordination, Kinesthetic, Sense, Proprioception, and Posture.  (15) minutes of Therapeutic Activities (TA) to improve functional performance.  (0) minutes of Gait Training (GT) to improve functional gait mechanics.  (0) minutes of Self- Care and Education  Unattended Electrical Stimulation (ES) for muscle performance or pain modulation.  Vasopneumatic Device Therapy () for management of swelling/edema. (36252)  BFR: Blood flow restriction applied during exercise  NP or (-): Not Performed    See EMR under MEDIA for written consent provided for Dry Needling- DATE   Palpation Assessment to determine the necessity for Functional Dry Needling     PATIENT EDUCATION AND HOME EXERCISES      Education/Self-Care provided:  (included in treatment unless specified above) minutes   Patient educated on the impairments noted above and the effects of physical therapy intervention to improve overall condition and Quality of Life  Patient was educated on all the above exercise prior/during/after for proper posture, positioning, and execution for safe performance with home exercise program.      Written Home Exercises Provided: yes. Prefers: [x] Printed [x] Electronic  Exercises were reviewed and Rylee was able to demonstrate them prior to the end of the session.  Rylee demonstrated good  understanding of the education provided. See EMR under Patient Instructions for exercises provided during therapy sessions.    ASSESSMENT     Jemima Aguillon tolerated Physical Therapy  session well with minimal  complaints of pain or discomfort.  Objective findings are improving with pain, range of motion, strength, endurance, exercise tolerance, and functional mobility.  Jemima Aguillon continues to have difficulty with closing off the right  hip joing in a FADIR positoin - lunges are very painful, as well as warrior-1 pose with trunk rotation - discontinued in therapy and advised not to do at home.  Jemima demonstrated good understanding of new exercises and will continue to progress at home until next follow-up.     Jemima Is progressing well towards her goals.   Pt prognosis is Good.     Pt will continue to benefit from skilled outpatient physical therapy to address the deficits listed in the problem list box on initial evaluation, provide pt/family education and to maximize pt's level of independence in the home and community environment.     Pt's spiritual, cultural and educational needs considered and pt agreeable to plan of care and goals.    Anticipated Barriers for therapy: none     GOALS:  SHORT TERM GOALS: 4 weeks, (9/26/2024) Progress   Recent signs and systems trend is improving in order to progress towards LTG's.     Patient will be independent with HEP in order to further progress and return to maximal function.     Pain rating at Worst: 5/10 in order to progress towards increased independence with activity.     Patient will be able to correct postural deviations in sitting and standing, to decrease pain and promote postural awareness for injury prevention.         LONG TERM GOALS: Discharge, (11/26/24) Progress   Patient will return to normal ADL, recreational, and work related activities with less pain and limitation.      Patient will improve AROM to stated goals in order to  return to maximal functional potential.      Patient will improve Strength to stated goals of appropriate musculature in order to improve functional independence.      Pain Rating at Best: 1/10 to improve Quality of Life.      Patient will meet predicted functional outcome (FOTO) score: 23% to increase self-worth & perceived functional ability.     Patient will have met/partially met personal goal of: getting back to all recreational activities without discomfort.             PLAN   Plan of care Certification: 9/26/2024 to  12/26/2024    Continue Plan of Care (POC) and progress per patient tolerance. See Treatment section for exercise progression.    Leidy Negron, PT    Disclaimer: This note was prepared using a voice recognition system and is likely to have sound alike errors within the text.

## 2024-10-16 NOTE — PROGRESS NOTES
Patient ID: Jemima Aguillon  YOB: 1965  MRN: [id number]    Chief Complaint: Pain of the Right Hip    Referred By: Elsa Cartagena PA-C    Occupation: GM at [employer]    History of Present Illness: Jemima Aguillon is a 59 y.o. female who presents today with Pain of the Right Hip    She complains of chronic right hip pain since 2019 when she slipped on a rock while hiking.  She has managed her pain with activity modification, stretching and NSAIDs.  She cycles and hikes regularly.  She was evaluated by Elsa Cartagena on 24 and referred for an MRI which demonstrated severe hip OA with calcific tendinitis on the lateral gluteal insertion.  She was referred to PT at [facility] and for a MSKUS guided hip joint injection which was performed on 24.    Today, she reports that she had excellent relief of her symptoms for about 2 days before her pain began to return.  She describes lateral hip pain centered around the greater trochanter that radiates medially toward her hip flexor and up her buttock to the lower back.  She has a lot of pain with standing or sitting for extended periods.  She continues to attend PT.     Past Medical History:   Past Medical History:   Diagnosis Date    Allergy      Past Surgical History:   Procedure Laterality Date     SECTION      x3    HYSTERECTOMY  2010    single oophorectomy; endometriosis    OOPHORECTOMY  2010    1 ovary removed    URETEROSCOPY       Family History   Problem Relation Name Age of Onset    Pancreatic cancer Father  78         at 80    Endometrial cancer Paternal Aunt      Breast cancer Neg Hx      Ovarian cancer Neg Hx      Colon cancer Neg Hx       Social History     Socioeconomic History    Marital status:     Number of children: 5   Occupational History    Occupation: Manager   Tobacco Use    Smoking status: Never    Smokeless tobacco: Never   Substance and Sexual Activity    Alcohol use: Not  Currently     Alcohol/week: 2.0 standard drinks of alcohol     Types: 2 Shots of liquor per week    Drug use: Never    Sexual activity: Yes     Partners: Male     Birth control/protection: See Surgical Hx     Medication List with Changes/Refills   Current Medications    BUSPIRONE (BUSPAR) 5 MG TAB    Take 1 tablet (5 mg total) by mouth 3 (three) times daily.    EPINEPHRINE (EPIPEN 2-AGAPITO) 0.3 MG/0.3 ML ATIN    Inject 0.3 mLs (0.3 mg total) into the muscle once. for 1 dose    ESTRADIOL (ESTRACE) 0.01 % (0.1 MG/GRAM) VAGINAL CREAM    Place 1 g vaginally once daily for 14 days, THEN 1 g twice a week.    VALACYCLOVIR (VALTREX) 500 MG TABLET    Take 3 tablets (1,500 mg total) by mouth once. Take at earliest onset of fever blister for 1 dose     Review of patient's allergies indicates:   Allergen Reactions    Peanut Anaphylaxis, Anxiety, Diarrhea, Hives, Itching, Nausea And Vomiting, Palpitations, Shortness Of Breath and Swelling    Soy Anaphylaxis, Anxiety, Diarrhea, Hives, Itching, Nausea And Vomiting, Other (See Comments), Palpitations, Shortness Of Breath and Swelling    Corn containing products Other (See Comments)    Wheat containing prod        Physical Exam:   There is no height or weight on file to calculate BMI.    Detailed MSK exam:     Right Hip:  Inspection:  No swelling, erythema or ecchymosis.   Palpation tenderness: Greater trochanter  Range of motion: 100 deg Flexion         45 deg IR         65 deg ER  Strength:  5/5 Extension    5/5 Flexion    5/5 Abduction    5/5 Adduction  Special Tests: Mild TORSTEN    Positive FADIR  Positive Log Roll  Negative Circumduction  Negative Piriformis    Negative SLR for radic  N/V Exam:  Tibial:    Normal sensory (plantar foot)  Normal motor (FHL)    Sup Peroneal:  Normal sensory (dorsal foot)  Normal motor (Peroneals)            Deep Peroneal:  Normal sensory (1st web space) Normal motor (EHL)    Sural:   Normal sensory (lateral foot)   Saphenous:   Normal sensory (medial  lower leg)   Normal pedal pulses, warm and well perfused with capillary refill < 2 sec       Imaging:     XR Results:  Results for orders placed during the hospital encounter of 06/07/22    X-Ray Hip 2 or 3 views Right (with Pelvis when performed)    Narrative  EXAMINATION:  XR HIP WITH PELVIS WHEN PERFORMED, 2 OR 3  VIEWS RIGHT    CLINICAL HISTORY:  Pain in right hip    TECHNIQUE:  AP view of the pelvis and frog leg lateral view of the right hip were performed.    COMPARISON:  None    FINDINGS:  Hip joints are symmetric in appearance.  No evidence of AVN.  No fracture or dislocation.  No significant joint space narrowing.  No soft tissue abnormality.    Impression  See above      Electronically signed by: Anjel Ochoa MD  Date:    06/07/2022  Time:    10:18      MRI Results:  Results for orders placed during the hospital encounter of 08/26/24    MRI Hip Without Contrast Right    Narrative  EXAM: MRI HIP WITHOUT CONTRAST RIGHT    CLINICAL HISTORY:  Right hip pain.    TECHNIQUE: Standard multiplanar pulse sequences without IV or intra-articular contrast.    COMPARISON: None.    FINDINGS:    Bones of the right hip are intact.  No fracture.  No suspicious osseous lesion.    Severe osteoarthritis with full-thickness chondral loss scattered throughout the joint, greatest at the central and anterior joint.  Severe reactive marrow edema and large peripheral osteophytes.  Degenerative tearing of the labrum noted.  Large effusion and synovitis.    Mild tendinosis of the right gluteus minimus tendon at the tendon.  Right gluteus medius intact.  Focal calcification or hydroxyapatite deposition abuts the posterior aspect of the subtrochanteric proximal femur, likely representing calcific tendinitis of the inserting gluteus fariba tendon.  Mild tendinosis right common hamstring tendon.  Right iliopsoas tendon normal.    Intact bony pelvis with normal marrow.  Contralateral hip with focal full-thickness chondral loss of the far  anterior joint.  Rectum and edema noted.  Intrapelvic contents are unremarkable.    Impression  1.     Severe osteoarthritis of the right hip.    2.    Mild tendinosis right gluteal tendons.  Evidence of calcific tendinitis of the distal gluteus and fariba tendon.    Finalized on: 8/26/2024 11:14 AM By:  Epifanio Rod MD  [facility]# [id number]      2024-08-26 11:17:[id number]    [facility]      This was discussed with the patient and / or family today.     Patient Instructions   Assessment:  Jemima Aguillon is a 59 y.o. female with a chief complaint of Pain of the Right Hip    Encounter Diagnoses   Name Primary?    Tendinopathy of right gluteus medius Yes    Greater trochanteric pain syndrome of right lower extremity     Primary osteoarthritis of right hip     Femoroacetabular impingement of right hip     Pain of right hip     Stiffness of right hip joint       Plan:  Patient with persistent right hip pain, now 4 weeks out from intra-articular corticosteroid injection.  Injection seems to have helped intra-articular type pain quite a bit, however still having quite a bit of pain and dysfunction, particularly of the about the lateral hip.  MRI does show some gluteal tendinopathy, some focal calcification of the gluteus fariba insertion.  NSAIDs contraindicated, as patient with multiple allergies.  Continue physical therapy.  We will plan for a right greater trochanteric bursa corticosteroid injection.    Follow-up:  10/24 for R hip GTB injection or sooner if there are any problems between now and then.    Thank you for choosing [facility] and Dr. Dennis Schmid for your orthopedic & sports medicine care. It is our goal to provide you with exceptional care that will help keep you healthy, active, and get you back in the game.    Please do not hesitate to reach out to us via email, phone, or MyChart with any questions, concerns, or feedback.    If you are experiencing pain/discomfort ,or have  questions after 5pm and would like to be connected to the [facility] [place] on-call team, please call this number and specify which Sports Medicine provider is treating you: [phone]       A copy of today's visit note has been sent to the referring provider.           Dennis Schmid MD  Primary Care Sports Medicine    Disclaimer: This note was prepared using a voice recognition system and is likely to have sound alike errors within the text.

## 2024-10-17 ENCOUNTER — CLINICAL SUPPORT (OUTPATIENT)
Dept: REHABILITATION | Facility: HOSPITAL | Age: 59
End: 2024-10-17
Payer: COMMERCIAL

## 2024-10-17 ENCOUNTER — OFFICE VISIT (OUTPATIENT)
Dept: SPORTS MEDICINE | Facility: CLINIC | Age: 59
End: 2024-10-17
Payer: COMMERCIAL

## 2024-10-17 DIAGNOSIS — M25.651 STIFFNESS OF RIGHT HIP JOINT: ICD-10-CM

## 2024-10-17 DIAGNOSIS — R53.1 DECREASED STRENGTH, ENDURANCE, AND MOBILITY: Primary | ICD-10-CM

## 2024-10-17 DIAGNOSIS — M67.951 TENDINOPATHY OF RIGHT GLUTEUS MEDIUS: Primary | ICD-10-CM

## 2024-10-17 DIAGNOSIS — M16.11 PRIMARY OSTEOARTHRITIS OF RIGHT HIP: ICD-10-CM

## 2024-10-17 DIAGNOSIS — M25.551 PAIN OF RIGHT HIP: ICD-10-CM

## 2024-10-17 DIAGNOSIS — Z74.09 DECREASED STRENGTH, ENDURANCE, AND MOBILITY: Primary | ICD-10-CM

## 2024-10-17 DIAGNOSIS — R68.89 DECREASED STRENGTH, ENDURANCE, AND MOBILITY: Primary | ICD-10-CM

## 2024-10-17 DIAGNOSIS — M25.551 GREATER TROCHANTERIC PAIN SYNDROME OF RIGHT LOWER EXTREMITY: ICD-10-CM

## 2024-10-17 DIAGNOSIS — M25.661 DECREASED RANGE OF MOTION OF RIGHT KNEE: ICD-10-CM

## 2024-10-17 DIAGNOSIS — M25.851 FEMOROACETABULAR IMPINGEMENT OF RIGHT HIP: ICD-10-CM

## 2024-10-17 PROCEDURE — 97530 THERAPEUTIC ACTIVITIES: CPT | Performed by: PHYSICAL THERAPIST

## 2024-10-17 PROCEDURE — 99999 PR PBB SHADOW E&M-EST. PATIENT-LVL III: CPT | Mod: PBBFAC,,, | Performed by: STUDENT IN AN ORGANIZED HEALTH CARE EDUCATION/TRAINING PROGRAM

## 2024-10-17 PROCEDURE — 99214 OFFICE O/P EST MOD 30 MIN: CPT | Mod: S$GLB,,, | Performed by: STUDENT IN AN ORGANIZED HEALTH CARE EDUCATION/TRAINING PROGRAM

## 2024-10-17 PROCEDURE — 97110 THERAPEUTIC EXERCISES: CPT | Performed by: PHYSICAL THERAPIST

## 2024-10-17 NOTE — PATIENT INSTRUCTIONS
Assessment:  Jemima Aguillon is a 59 y.o. female with a chief complaint of Pain of the Right Hip    Encounter Diagnoses   Name Primary?    Tendinopathy of right gluteus medius Yes    Greater trochanteric pain syndrome of right lower extremity     Primary osteoarthritis of right hip     Femoroacetabular impingement of right hip     Pain of right hip     Stiffness of right hip joint       Plan:  Patient with persistent right hip pain, now 4 weeks out from intra-articular corticosteroid injection.  Injection seems to have helped intra-articular type pain quite a bit, however still having quite a bit of pain and dysfunction, particularly of the about the lateral hip.  MRI does show some gluteal tendinopathy, some focal calcification of the gluteus fariba insertion.  NSAIDs contraindicated, as patient with multiple allergies.  Continue physical therapy.  We will plan for a right greater trochanteric bursa corticosteroid injection.    Follow-up:  10/24 for R hip GTB injection or sooner if there are any problems between now and then.    Thank you for choosing [facility] and Dr. Dennis Schmid for your orthopedic & sports medicine care. It is our goal to provide you with exceptional care that will help keep you healthy, active, and get you back in the game.    Please do not hesitate to reach out to us via email, phone, or MyChart with any questions, concerns, or feedback.    If you are experiencing pain/discomfort ,or have questions after 5pm and would like to be connected to the [facility] [place] on-call team, please call this number and specify which Sports Medicine provider is treating you: [phone]

## 2024-10-23 NOTE — PROGRESS NOTES
"[facility]   Physical Therapy Treatment Note        Name: Jemima Romero [facility] Number: [id number]    Therapy Diagnosis:   Encounter Diagnoses   Name Primary?    Decreased strength, endurance, and mobility Yes    Decreased range of motion of right knee      Physician: Elsa Cartagena PA-C    Visit Date: 10/17/2024    Physician Orders: PT Eval and Treat  Medical Diagnosis from Referral: Greater trochanteric bursitis of right hip [M70.61]  Evaluation Date: 9/26/2024  Authorization Period Expiration: 6/7/23  Plan of Care Expiration: 12/26/24      Progress Update: 10/26/24     Visit # / Visits authorized: 2 / 25 (+eval)                       FOTO: Scored : 1 / 3 (9/26/24)       PRECAUTIONS: Standard Precautions      MD Visit: 3 weeks.    PTA Visit #: 0/5     Time In: 1600  Time Out: 1700  Total Billable Time: 55 minutes    SUBJECTIVE     Pt reports:  Very sore following last session, but was happy because it was a "great workout"  Compliance with Hep: Daily  Response to previous treatment: no adverse reactions to treatment/updated HEP  Functional change: Better    Pain: 1/10   Worst: 5/10  Location: Right anterior hip      OBJECTIVE     Objective Measures updated at progress report unless specified otherwise.  RANGE OF MOTION:     Hip AROM/PROM Right    Left    Goal   Hip Flexion (120) 145 150 115   Hip IR (45) 32 38 40   Hip ER (45) 35 40 40        Treatment     Gym/Equipment Access: [employer] - [place]  Time to Complete Exercises: increased for cueing and education    Jemima received the treatments listed below:      CPT Intervention  Right Hip Duration / Intensity  10/17/2024   TE Chondral mobility Rec- 5 min L2   TE Treadmill 5% x8 minutes   MT Joint Mobility     TE Mobility Internal rotation - wipers- 5'- prone   TE Stretching:  Hip Flexor Trevor Test- 30" holds with quad strap overload (3' total)  Piriformis Stretch - figure 4- supine 30" x5   TE Dynamic Mobility " 2X25 feet - hugs, sweeps, kicks, grabs  Scorpion Twist - x10 each direction.   TE Hip Mobility     TE  Mini-Band Series Green T-band- 2x2' each  Diag Walks  Abduction walks  Squats - with 10s hold every 10 reps   TE Leg Press     TE Knee Extension     TE Hamstring Curl     TA SS#1 3 rounds  Sled Push- #45 - x2 lap  Wall Sit - 45 sec  Plank 30 sec   TA  Function Tests     PLAN         CPT Codes available for Billing:   (0) minutes of Manual therapy (MT) to improve pain and ROM.  (40) minutes of Therapeutic Exercise (TE) to develop strength, endurance, range of motion, and flexibility.  (0) minutes of Neuromuscular Re-Education (NMR)  to improve: Balance, Coordination, Kinesthetic, Sense, Proprioception, and Posture.  (15) minutes of Therapeutic Activities (TA) to improve functional performance.  (0) minutes of Gait Training (GT) to improve functional gait mechanics.  (0) minutes of Self- Care and Education  Unattended Electrical Stimulation (ES) for muscle performance or pain modulation.  Vasopneumatic Device Therapy () for management of swelling/edema. (07650)  BFR: Blood flow restriction applied during exercise  NP or (-): Not Performed    See EMR under MEDIA for written consent provided for Dry Needling- DATE   Palpation Assessment to determine the necessity for Functional Dry Needling     PATIENT EDUCATION AND HOME EXERCISES      Education/Self-Care provided:  (included in treatment unless specified above) minutes   Patient educated on the impairments noted above and the effects of physical therapy intervention to improve overall condition and Quality of Life  Patient was educated on all the above exercise prior/during/after for proper posture, positioning, and execution for safe performance with home exercise program.      Written Home Exercises Provided: yes. Prefers: [x] Printed [x] Electronic  Exercises were reviewed and Rylee was able to demonstrate them prior to the end of the session.  Rylee demonstrated  good understanding of the education provided. See EMR under Patient Instructions for exercises provided during therapy sessions.    ASSESSMENT     Patient tolerated PT session while minimal complaints of pain or discomfort. She overall was very fatigued and tired today, therefore wanted to focus more on energy conserving exercise. She had no trouble with endurance space program, and is going to continue with her exercises at home until next follow up.     Jemima Is progressing well towards her goals.   Pt prognosis is Good.     Pt will continue to benefit from skilled outpatient physical therapy to address the deficits listed in the problem list box on initial evaluation, provide pt/family education and to maximize pt's level of independence in the home and community environment.     Pt's spiritual, cultural and educational needs considered and pt agreeable to plan of care and goals.    Anticipated Barriers for therapy: none     GOALS:  SHORT TERM GOALS: 4 weeks, (9/26/2024) Progress   Recent signs and systems trend is improving in order to progress towards LTG's.  progressing   Patient will be independent with HEP in order to further progress and return to maximal function. progressing    Pain rating at Worst: 5/10 in order to progress towards increased independence with activity. progressing    Patient will be able to correct postural deviations in sitting and standing, to decrease pain and promote postural awareness for injury prevention.  progressing       LONG TERM GOALS: Discharge, (11/26/24) Progress   Patient will return to normal ADL, recreational, and work related activities with less pain and limitation.   progressing   Patient will improve AROM to stated goals in order to return to maximal functional potential.  progressing    Patient will improve Strength to stated goals of appropriate musculature in order to improve functional independence.  progressing    Pain Rating at Best: 1/10 to improve Quality  of Life.   progressing   Patient will meet predicted functional outcome (FOTO) score: 23% to increase self-worth & perceived functional ability.  progressing   Patient will have met/partially met personal goal of: getting back to all recreational activities without discomfort.   progressing          PLAN   Plan of care Certification: 9/26/2024 to  12/26/2024    Continue Plan of Care (POC) and progress per patient tolerance. See Treatment section for exercise progression.    Leidy Negron, PT    Disclaimer: This note was prepared using a voice recognition system and is likely to have sound alike errors within the text.

## 2024-10-24 ENCOUNTER — PROCEDURE VISIT (OUTPATIENT)
Dept: SPORTS MEDICINE | Facility: CLINIC | Age: 59
End: 2024-10-24
Payer: COMMERCIAL

## 2024-10-24 DIAGNOSIS — M25.551 PAIN OF RIGHT HIP: ICD-10-CM

## 2024-10-24 DIAGNOSIS — M25.551 GREATER TROCHANTERIC PAIN SYNDROME OF RIGHT LOWER EXTREMITY: ICD-10-CM

## 2024-10-24 DIAGNOSIS — M67.951 TENDINOPATHY OF RIGHT GLUTEUS MEDIUS: Primary | ICD-10-CM

## 2024-10-24 RX ORDER — TRIAMCINOLONE ACETONIDE 40 MG/ML
40 INJECTION, SUSPENSION INTRA-ARTICULAR; INTRAMUSCULAR
Status: DISCONTINUED | OUTPATIENT
Start: 2024-10-24 | End: 2024-10-24 | Stop reason: HOSPADM

## 2024-10-24 RX ADMIN — TRIAMCINOLONE ACETONIDE 40 MG: 40 INJECTION, SUSPENSION INTRA-ARTICULAR; INTRAMUSCULAR at 08:10

## 2024-10-24 NOTE — PROCEDURES
Large Joint Aspiration/Injection: R greater trochanteric bursa    Date/Time: 10/24/2024 8:20 AM    Performed by: Dennis Schmid MD  Authorized by: Dennis Schmid MD    Consent Done?:  Yes (Verbal)  Indications:  Pain and diagnostic evaluation  Site marked: the procedure site was marked    Timeout: prior to procedure the correct patient, procedure, and site was verified      Local anesthesia used?: Yes    Local anesthetic:  Topical anesthetic, lidocaine 1% without epinephrine and bupivacaine 0.5% without epinephrine  Anesthetic total (ml):  4      Details:  Needle Size:  22 G  Ultrasonic Guidance for needle placement?: Yes    Images are saved and documented.  Approach:  Lateral  Location:  Hip  Site:  R greater trochanteric bursa  Medications:  40 mg triamcinolone acetonide 40 mg/mL  Patient tolerance:  Patient tolerated the procedure well with no immediate complications     Ultrasound guidance was used for needle localization. Images were saved and stored for documentation. The appropriate structures were visualized. Dynamic visualization of the needle was continuous throughout the procedures and maintained good position.     We discussed the proper protocols after the injection such as no submerging pools, baths tubs, or hot tubs for 24 hr.  Showering is okay today.  We also discussed that blood sugars can be elevated after an injection and asked patient to properly check their sugars over the next few days and contact their PCP if there are any concerns.  We discussed red flags such as fevers, chills, red, warm, tender joint at the area of injection to please seek medical care immediately.

## 2024-10-24 NOTE — PATIENT INSTRUCTIONS
Assessment:  Jemima Aguillon is a 59 y.o. female with a chief complaint of No chief complaint on file.    Encounter Diagnoses   Name Primary?    Tendinopathy of right gluteus medius Yes    Greater trochanteric pain syndrome of right lower extremity     Pain of right hip       Plan:  Right hip greater trochanteric bursa corticosteroid injection today.    Proper protocols after the injection included: no submerging pools, baths tubs, or hot tubs for 24 hr.  Showering is okay today.  Side effects of the corticosteroid injection can include elevated blood glucose levels and blood pressures, so if you are taking medications for these, please monitor closely, and contact your PCP if any issues.  Red flag symptoms include fever, chills, nausea, vomiting, red, warm, tender joint at the area of injection.  If you are noticing these symptoms, they may be indicative of an infection, and please seek medical care immediately, either by calling our clinic or going to the emergency room.    Follow-up:  Six weeks or sooner if there are any problems between now and then.    Thank you for choosing [facility] and Dr. Dennis Schmid for your orthopedic & sports medicine care. It is our goal to provide you with exceptional care that will help keep you healthy, active, and get you back in the game.    Please do not hesitate to reach out to us via email, phone, or MyChart with any questions, concerns, or feedback.    If you are experiencing pain/discomfort ,or have questions after 5pm and would like to be connected to the [facility] on-call team, please call this number and specify which Sports Medicine provider is treating you: [phone]

## 2024-10-30 ENCOUNTER — TELEPHONE (OUTPATIENT)
Dept: INTERNAL MEDICINE | Facility: CLINIC | Age: 59
End: 2024-10-30

## 2024-10-30 ENCOUNTER — OFFICE VISIT (OUTPATIENT)
Dept: INTERNAL MEDICINE | Facility: CLINIC | Age: 59
End: 2024-10-30
Payer: COMMERCIAL

## 2024-10-30 VITALS
HEIGHT: 62 IN | OXYGEN SATURATION: 95 % | WEIGHT: 134.94 LBS | BODY MASS INDEX: 24.83 KG/M2 | TEMPERATURE: 98 F | SYSTOLIC BLOOD PRESSURE: 120 MMHG | HEART RATE: 68 BPM | DIASTOLIC BLOOD PRESSURE: 70 MMHG

## 2024-10-30 DIAGNOSIS — Z23 NEED FOR VACCINATION: ICD-10-CM

## 2024-10-30 DIAGNOSIS — D68.03: ICD-10-CM

## 2024-10-30 DIAGNOSIS — Z91.010 PEANUT ALLERGY: ICD-10-CM

## 2024-10-30 DIAGNOSIS — Z00.00 ROUTINE GENERAL MEDICAL EXAMINATION AT HEALTH CARE FACILITY: Primary | ICD-10-CM

## 2024-10-30 PROCEDURE — 90471 IMMUNIZATION ADMIN: CPT | Mod: S$GLB,,, | Performed by: INTERNAL MEDICINE

## 2024-10-30 PROCEDURE — 99396 PREV VISIT EST AGE 40-64: CPT | Mod: 25,S$GLB,, | Performed by: INTERNAL MEDICINE

## 2024-10-30 PROCEDURE — 99999 PR PBB SHADOW E&M-EST. PATIENT-LVL III: CPT | Mod: PBBFAC,,, | Performed by: INTERNAL MEDICINE

## 2024-10-30 PROCEDURE — 90656 IIV3 VACC NO PRSV 0.5 ML IM: CPT | Mod: S$GLB,,, | Performed by: INTERNAL MEDICINE

## 2024-10-30 RX ORDER — EPINEPHRINE 0.3 MG/.3ML
1 INJECTION SUBCUTANEOUS ONCE
Qty: 2 EACH | Refills: 3 | Status: SHIPPED | OUTPATIENT
Start: 2024-10-30 | End: 2024-10-30

## 2024-10-31 ENCOUNTER — CLINICAL SUPPORT (OUTPATIENT)
Dept: REHABILITATION | Facility: HOSPITAL | Age: 59
End: 2024-10-31
Payer: COMMERCIAL

## 2024-10-31 DIAGNOSIS — R68.89 DECREASED STRENGTH, ENDURANCE, AND MOBILITY: Primary | ICD-10-CM

## 2024-10-31 DIAGNOSIS — R53.1 DECREASED STRENGTH, ENDURANCE, AND MOBILITY: Primary | ICD-10-CM

## 2024-10-31 DIAGNOSIS — M25.661 DECREASED RANGE OF MOTION OF RIGHT KNEE: ICD-10-CM

## 2024-10-31 DIAGNOSIS — Z74.09 DECREASED STRENGTH, ENDURANCE, AND MOBILITY: Primary | ICD-10-CM

## 2024-10-31 PROCEDURE — 97110 THERAPEUTIC EXERCISES: CPT | Performed by: PHYSICAL THERAPIST

## 2024-10-31 PROCEDURE — 97530 THERAPEUTIC ACTIVITIES: CPT | Performed by: PHYSICAL THERAPIST

## 2024-11-07 ENCOUNTER — LAB VISIT (OUTPATIENT)
Dept: LAB | Facility: HOSPITAL | Age: 59
End: 2024-11-07
Attending: INTERNAL MEDICINE
Payer: COMMERCIAL

## 2024-11-07 DIAGNOSIS — Z00.00 ROUTINE GENERAL MEDICAL EXAMINATION AT HEALTH CARE FACILITY: ICD-10-CM

## 2024-11-07 LAB
ALBUMIN SERPL BCP-MCNC: 4 G/DL (ref 3.5–5.2)
ALP SERPL-CCNC: 63 U/L (ref 40–150)
ALT SERPL W/O P-5'-P-CCNC: 21 U/L (ref 10–44)
ANION GAP SERPL CALC-SCNC: 9 MMOL/L (ref 8–16)
AST SERPL-CCNC: 25 U/L (ref 10–40)
BASOPHILS # BLD AUTO: 0.07 K/UL (ref 0–0.2)
BASOPHILS NFR BLD: 1.1 % (ref 0–1.9)
BILIRUB SERPL-MCNC: 0.5 MG/DL (ref 0.1–1)
BUN SERPL-MCNC: 21 MG/DL (ref 6–20)
CALCIUM SERPL-MCNC: 9.1 MG/DL (ref 8.7–10.5)
CHLORIDE SERPL-SCNC: 108 MMOL/L (ref 95–110)
CHOLEST SERPL-MCNC: 202 MG/DL (ref 120–199)
CHOLEST/HDLC SERPL: 2.8 {RATIO} (ref 2–5)
CO2 SERPL-SCNC: 25 MMOL/L (ref 23–29)
CREAT SERPL-MCNC: 1 MG/DL (ref 0.5–1.4)
DIFFERENTIAL METHOD BLD: NORMAL
EOSINOPHIL # BLD AUTO: 0.2 K/UL (ref 0–0.5)
EOSINOPHIL NFR BLD: 3.3 % (ref 0–8)
ERYTHROCYTE [DISTWIDTH] IN BLOOD BY AUTOMATED COUNT: 14.1 % (ref 11.5–14.5)
EST. GFR  (NO RACE VARIABLE): >60 ML/MIN/1.73 M^2
GLUCOSE SERPL-MCNC: 77 MG/DL (ref 70–110)
HCT VFR BLD AUTO: 42.1 % (ref 37–48.5)
HDLC SERPL-MCNC: 71 MG/DL (ref 40–75)
HDLC SERPL: 35.1 % (ref 20–50)
HGB BLD-MCNC: 13.8 G/DL (ref 12–16)
IMM GRANULOCYTES # BLD AUTO: 0.01 K/UL (ref 0–0.04)
IMM GRANULOCYTES NFR BLD AUTO: 0.2 % (ref 0–0.5)
LDLC SERPL CALC-MCNC: 115.4 MG/DL (ref 63–159)
LYMPHOCYTES # BLD AUTO: 1.9 K/UL (ref 1–4.8)
LYMPHOCYTES NFR BLD: 29.9 % (ref 18–48)
MCH RBC QN AUTO: 30.4 PG (ref 27–31)
MCHC RBC AUTO-ENTMCNC: 32.8 G/DL (ref 32–36)
MCV RBC AUTO: 93 FL (ref 82–98)
MONOCYTES # BLD AUTO: 0.6 K/UL (ref 0.3–1)
MONOCYTES NFR BLD: 9.4 % (ref 4–15)
NEUTROPHILS # BLD AUTO: 3.5 K/UL (ref 1.8–7.7)
NEUTROPHILS NFR BLD: 56.1 % (ref 38–73)
NONHDLC SERPL-MCNC: 131 MG/DL
NRBC BLD-RTO: 0 /100 WBC
PLATELET # BLD AUTO: 280 K/UL (ref 150–450)
PMV BLD AUTO: 10.2 FL (ref 9.2–12.9)
POTASSIUM SERPL-SCNC: 3.9 MMOL/L (ref 3.5–5.1)
PROT SERPL-MCNC: 6.9 G/DL (ref 6–8.4)
RBC # BLD AUTO: 4.54 M/UL (ref 4–5.4)
SODIUM SERPL-SCNC: 142 MMOL/L (ref 136–145)
TRIGL SERPL-MCNC: 78 MG/DL (ref 30–150)
TSH SERPL DL<=0.005 MIU/L-ACNC: 1.85 UIU/ML (ref 0.4–4)
WBC # BLD AUTO: 6.28 K/UL (ref 3.9–12.7)

## 2024-11-07 PROCEDURE — 84443 ASSAY THYROID STIM HORMONE: CPT | Performed by: INTERNAL MEDICINE

## 2024-11-07 PROCEDURE — 85025 COMPLETE CBC W/AUTO DIFF WBC: CPT | Performed by: INTERNAL MEDICINE

## 2024-11-07 PROCEDURE — 80053 COMPREHEN METABOLIC PANEL: CPT | Performed by: INTERNAL MEDICINE

## 2024-11-07 PROCEDURE — 36415 COLL VENOUS BLD VENIPUNCTURE: CPT | Mod: PO | Performed by: INTERNAL MEDICINE

## 2024-11-07 PROCEDURE — 80061 LIPID PANEL: CPT | Performed by: INTERNAL MEDICINE

## 2024-12-06 DIAGNOSIS — N95.9 MENOPAUSAL AND POSTMENOPAUSAL DISORDER: ICD-10-CM

## 2024-12-06 RX ORDER — ESTRADIOL 0.1 MG/G
CREAM VAGINAL
Qty: 42.5 G | Refills: 0 | Status: SHIPPED | OUTPATIENT
Start: 2024-12-06

## 2025-04-24 DIAGNOSIS — N95.9 MENOPAUSAL AND POSTMENOPAUSAL DISORDER: ICD-10-CM

## 2025-04-24 RX ORDER — ESTRADIOL 0.1 MG/G
1 CREAM VAGINAL
Qty: 42.5 G | Refills: 0 | Status: SHIPPED | OUTPATIENT
Start: 2025-04-24